# Patient Record
Sex: MALE | Race: WHITE | NOT HISPANIC OR LATINO | Employment: OTHER | URBAN - METROPOLITAN AREA
[De-identification: names, ages, dates, MRNs, and addresses within clinical notes are randomized per-mention and may not be internally consistent; named-entity substitution may affect disease eponyms.]

---

## 2024-02-29 ENCOUNTER — APPOINTMENT (OUTPATIENT)
Dept: RADIOLOGY | Facility: HOSPITAL | Age: 34
End: 2024-02-29
Payer: COMMERCIAL

## 2024-02-29 ENCOUNTER — HOSPITAL ENCOUNTER (EMERGENCY)
Facility: HOSPITAL | Age: 34
Discharge: HOME | End: 2024-02-29
Attending: EMERGENCY MEDICINE
Payer: COMMERCIAL

## 2024-02-29 VITALS
HEART RATE: 70 BPM | BODY MASS INDEX: 30.42 KG/M2 | RESPIRATION RATE: 16 BRPM | OXYGEN SATURATION: 96 % | SYSTOLIC BLOOD PRESSURE: 138 MMHG | TEMPERATURE: 98.2 F | HEIGHT: 73 IN | DIASTOLIC BLOOD PRESSURE: 80 MMHG | WEIGHT: 229.5 LBS

## 2024-02-29 DIAGNOSIS — N20.1 URETERAL CALCULUS, LEFT: Primary | ICD-10-CM

## 2024-02-29 LAB
ALBUMIN SERPL-MCNC: 4.6 G/DL (ref 3.5–5)
ALP BLD-CCNC: 61 U/L (ref 35–125)
ALT SERPL-CCNC: 36 U/L (ref 5–40)
AMORPH CRY #/AREA UR COMP ASSIST: ABNORMAL /HPF
ANION GAP SERPL CALC-SCNC: 8 MMOL/L
APPEARANCE UR: ABNORMAL
AST SERPL-CCNC: 21 U/L (ref 5–40)
BASOPHILS # BLD AUTO: 0.03 X10*3/UL (ref 0–0.1)
BASOPHILS NFR BLD AUTO: 0.4 %
BILIRUB SERPL-MCNC: 0.4 MG/DL (ref 0.1–1.2)
BILIRUB UR STRIP.AUTO-MCNC: NEGATIVE MG/DL
BUN SERPL-MCNC: 14 MG/DL (ref 8–25)
CALCIUM SERPL-MCNC: 9.4 MG/DL (ref 8.5–10.4)
CHLORIDE SERPL-SCNC: 104 MMOL/L (ref 97–107)
CO2 SERPL-SCNC: 25 MMOL/L (ref 24–31)
COLOR UR: YELLOW
CREAT SERPL-MCNC: 0.9 MG/DL (ref 0.4–1.6)
EGFRCR SERPLBLD CKD-EPI 2021: >90 ML/MIN/1.73M*2
EOSINOPHIL # BLD AUTO: 0.12 X10*3/UL (ref 0–0.7)
EOSINOPHIL NFR BLD AUTO: 1.6 %
ERYTHROCYTE [DISTWIDTH] IN BLOOD BY AUTOMATED COUNT: 12.2 % (ref 11.5–14.5)
GLUCOSE SERPL-MCNC: 124 MG/DL (ref 65–99)
GLUCOSE UR STRIP.AUTO-MCNC: NORMAL MG/DL
HCT VFR BLD AUTO: 43.1 % (ref 41–52)
HGB BLD-MCNC: 15 G/DL (ref 13.5–17.5)
IMM GRANULOCYTES # BLD AUTO: 0.02 X10*3/UL (ref 0–0.7)
IMM GRANULOCYTES NFR BLD AUTO: 0.3 % (ref 0–0.9)
KETONES UR STRIP.AUTO-MCNC: NEGATIVE MG/DL
LEUKOCYTE ESTERASE UR QL STRIP.AUTO: NEGATIVE
LIPASE SERPL-CCNC: 22 U/L (ref 16–63)
LYMPHOCYTES # BLD AUTO: 1.47 X10*3/UL (ref 1.2–4.8)
LYMPHOCYTES NFR BLD AUTO: 19.5 %
MCH RBC QN AUTO: 27.7 PG (ref 26–34)
MCHC RBC AUTO-ENTMCNC: 34.8 G/DL (ref 32–36)
MCV RBC AUTO: 80 FL (ref 80–100)
MONOCYTES # BLD AUTO: 0.54 X10*3/UL (ref 0.1–1)
MONOCYTES NFR BLD AUTO: 7.2 %
MUCOUS THREADS #/AREA URNS AUTO: ABNORMAL /LPF
NEUTROPHILS # BLD AUTO: 5.37 X10*3/UL (ref 1.2–7.7)
NEUTROPHILS NFR BLD AUTO: 71 %
NITRITE UR QL STRIP.AUTO: NEGATIVE
NRBC BLD-RTO: 0 /100 WBCS (ref 0–0)
PH UR STRIP.AUTO: 6.5 [PH]
PLATELET # BLD AUTO: 200 X10*3/UL (ref 150–450)
POTASSIUM SERPL-SCNC: 3.5 MMOL/L (ref 3.4–5.1)
PROT SERPL-MCNC: 6.9 G/DL (ref 5.9–7.9)
PROT UR STRIP.AUTO-MCNC: ABNORMAL MG/DL
RBC # BLD AUTO: 5.41 X10*6/UL (ref 4.5–5.9)
RBC # UR STRIP.AUTO: ABNORMAL /UL
RBC #/AREA URNS AUTO: >20 /HPF
SODIUM SERPL-SCNC: 137 MMOL/L (ref 133–145)
SP GR UR STRIP.AUTO: 1.02
UROBILINOGEN UR STRIP.AUTO-MCNC: NORMAL MG/DL
WBC # BLD AUTO: 7.6 X10*3/UL (ref 4.4–11.3)
WBC #/AREA URNS AUTO: ABNORMAL /HPF

## 2024-02-29 PROCEDURE — 2500000002 HC RX 250 W HCPCS SELF ADMINISTERED DRUGS (ALT 637 FOR MEDICARE OP, ALT 636 FOR OP/ED): Performed by: EMERGENCY MEDICINE

## 2024-02-29 PROCEDURE — 74176 CT ABD & PELVIS W/O CONTRAST: CPT

## 2024-02-29 PROCEDURE — 74176 CT ABD & PELVIS W/O CONTRAST: CPT | Performed by: RADIOLOGY

## 2024-02-29 PROCEDURE — 81001 URINALYSIS AUTO W/SCOPE: CPT | Performed by: EMERGENCY MEDICINE

## 2024-02-29 PROCEDURE — 83690 ASSAY OF LIPASE: CPT | Performed by: EMERGENCY MEDICINE

## 2024-02-29 PROCEDURE — 99284 EMERGENCY DEPT VISIT MOD MDM: CPT | Mod: 25

## 2024-02-29 PROCEDURE — 96361 HYDRATE IV INFUSION ADD-ON: CPT

## 2024-02-29 PROCEDURE — 85025 COMPLETE CBC W/AUTO DIFF WBC: CPT | Performed by: EMERGENCY MEDICINE

## 2024-02-29 PROCEDURE — 2500000004 HC RX 250 GENERAL PHARMACY W/ HCPCS (ALT 636 FOR OP/ED): Performed by: EMERGENCY MEDICINE

## 2024-02-29 PROCEDURE — 36415 COLL VENOUS BLD VENIPUNCTURE: CPT | Performed by: EMERGENCY MEDICINE

## 2024-02-29 PROCEDURE — 96374 THER/PROPH/DIAG INJ IV PUSH: CPT

## 2024-02-29 PROCEDURE — 80053 COMPREHEN METABOLIC PANEL: CPT | Performed by: EMERGENCY MEDICINE

## 2024-02-29 RX ORDER — TAMSULOSIN HYDROCHLORIDE 0.4 MG/1
0.4 CAPSULE ORAL DAILY
Qty: 6 CAPSULE | Refills: 0 | Status: SHIPPED | OUTPATIENT
Start: 2024-02-29 | End: 2024-03-06

## 2024-02-29 RX ORDER — IBUPROFEN 600 MG/1
600 TABLET ORAL EVERY 8 HOURS PRN
Qty: 15 TABLET | Refills: 0 | Status: SHIPPED | OUTPATIENT
Start: 2024-02-29 | End: 2024-03-10

## 2024-02-29 RX ORDER — TAMSULOSIN HYDROCHLORIDE 0.4 MG/1
0.4 CAPSULE ORAL ONCE
Status: COMPLETED | OUTPATIENT
Start: 2024-02-29 | End: 2024-02-29

## 2024-02-29 RX ORDER — KETOROLAC TROMETHAMINE 30 MG/ML
15 INJECTION, SOLUTION INTRAMUSCULAR; INTRAVENOUS ONCE
Status: COMPLETED | OUTPATIENT
Start: 2024-02-29 | End: 2024-02-29

## 2024-02-29 RX ADMIN — TAMSULOSIN HYDROCHLORIDE 0.4 MG: 0.4 CAPSULE ORAL at 19:41

## 2024-02-29 RX ADMIN — SODIUM CHLORIDE, SODIUM LACTATE, POTASSIUM CHLORIDE, AND CALCIUM CHLORIDE 1000 ML: 600; 310; 30; 20 INJECTION, SOLUTION INTRAVENOUS at 15:33

## 2024-02-29 RX ADMIN — KETOROLAC TROMETHAMINE 15 MG: 30 INJECTION INTRAMUSCULAR; INTRAVENOUS at 15:33

## 2024-02-29 ASSESSMENT — PAIN SCALES - GENERAL
PAINLEVEL_OUTOF10: 0 - NO PAIN
PAINLEVEL_OUTOF10: 7

## 2024-02-29 ASSESSMENT — COLUMBIA-SUICIDE SEVERITY RATING SCALE - C-SSRS
2. HAVE YOU ACTUALLY HAD ANY THOUGHTS OF KILLING YOURSELF?: NO
1. IN THE PAST MONTH, HAVE YOU WISHED YOU WERE DEAD OR WISHED YOU COULD GO TO SLEEP AND NOT WAKE UP?: NO
6. HAVE YOU EVER DONE ANYTHING, STARTED TO DO ANYTHING, OR PREPARED TO DO ANYTHING TO END YOUR LIFE?: NO

## 2024-02-29 ASSESSMENT — PAIN DESCRIPTION - PAIN TYPE: TYPE: ACUTE PAIN

## 2024-02-29 ASSESSMENT — PAIN DESCRIPTION - PROGRESSION: CLINICAL_PROGRESSION: NOT CHANGED

## 2024-02-29 ASSESSMENT — PAIN DESCRIPTION - ONSET: ONSET: SUDDEN

## 2024-02-29 ASSESSMENT — PAIN - FUNCTIONAL ASSESSMENT: PAIN_FUNCTIONAL_ASSESSMENT: 0-10

## 2024-02-29 ASSESSMENT — PAIN DESCRIPTION - ORIENTATION: ORIENTATION: LEFT

## 2024-02-29 ASSESSMENT — PAIN DESCRIPTION - DESCRIPTORS: DESCRIPTORS: SHARP

## 2024-02-29 ASSESSMENT — PAIN DESCRIPTION - FREQUENCY: FREQUENCY: INTERMITTENT

## 2024-02-29 NOTE — ED PROVIDER NOTES
HPI   Chief Complaint   Patient presents with    Flank Pain     I started ;having a twinge of pain yesterday but it got worse today the pain is in my lt flank it is sharp and I feel nauseated and the pain goes to the front lt abd       The patient is a 33-year-old male who presents for evaluation of left flank pain.  The day for yesterday the patient states that he suddenly developed a pain in his left flank that was wrapping around to the left lower quadrant.  Initially it felt like a mild backache.  The pain was mild and eventually resolved spontaneously.  The patient had no pain yesterday.  Today approximately 90 minutes prior to arrival he was crouched down and bending forward working on a tractor when the pain returned.  This time the pain was much more severe and was accompanied by some nausea.  No vomiting.  No testicular pain.  The patient reports that the pain is 7 out of 10 now and was even more severe prior to arrival.  Denies any dysuria or gross hematuria.  No fevers.  No known history of kidney stones.                          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    Nondisplaced fracture of distal phalanx of right great toe, initial encounter for open fracture 06/11/2019    Open nondisplaced fracture of distal phalanx of right great toe, initial encounter    Pain in right toe(s) 06/11/2019    Pain of toe of right foot    Personal history of other benign neoplasm 07/31/2015    History of other benign neoplasm    Personal history of other diseases of the nervous system and sense organs 07/31/2015    History of exotropia    Streptococcal pharyngitis 09/13/2019    Streptococcus pharyngitis    Unspecified exophthalmos 02/02/2015    Proptosis    Unspecified  exophthalmos 07/31/2015    Exophthalmos, acquired    Unspecified open wound of unspecified toe(s) without damage to nail, initial encounter 06/25/2019    Open wound of toe, initial encounter     No past surgical history on file.  No family history on file.  Social History     Tobacco Use    Smoking status: Not on file    Smokeless tobacco: Not on file   Substance Use Topics    Alcohol use: Not on file    Drug use: Not on file       Physical Exam   ED Triage Vitals [02/29/24 1516]   Temperature Heart Rate Respirations BP   36.8 °C (98.2 °F) 76 18 (!) 150/92      Pulse Ox Temp Source Heart Rate Source Patient Position   98 % Oral Monitor Sitting      BP Location FiO2 (%)     Left arm --       Physical Exam  Vitals and nursing note reviewed.   Constitutional:       General: He is not in acute distress.     Appearance: Normal appearance.   HENT:      Head: Normocephalic and atraumatic.      Mouth/Throat:      Mouth: Mucous membranes are moist.      Pharynx: Oropharynx is clear. No oropharyngeal exudate or posterior oropharyngeal erythema.   Eyes:      Extraocular Movements: Extraocular movements intact.      Conjunctiva/sclera: Conjunctivae normal.      Pupils: Pupils are equal, round, and reactive to light.   Cardiovascular:      Rate and Rhythm: Normal rate and regular rhythm.      Heart sounds: No murmur heard.  Pulmonary:      Effort: Pulmonary effort is normal.      Breath sounds: Normal breath sounds. No wheezing, rhonchi or rales.   Abdominal:      General: Abdomen is flat. There is no distension.      Palpations: Abdomen is soft.      Tenderness: There is no abdominal tenderness. There is left CVA tenderness. There is no right CVA tenderness.      Comments: Although the patient reports that he has a feeling of pain radiating around to the left lower quadrant, there is no tenderness elicited with palpation of the left lower quadrant.   Musculoskeletal:      Cervical back: Normal range of motion and neck supple.  No rigidity or tenderness.   Lymphadenopathy:      Cervical: No cervical adenopathy.   Skin:     General: Skin is warm and dry.      Findings: No rash.   Neurological:      Mental Status: He is alert.   Psychiatric:         Mood and Affect: Mood normal.         Behavior: Behavior normal.         ED Course & MDM   Diagnoses as of 02/29/24 1923   Ureteral calculus, left       Medical Decision Making  The patient was treated with IV fluids and IV Toradol.  His pain was well-controlled.  Labs were reviewed.  CBC and basic metabolic panel were unremarkable.  Urinalysis showed some microscopic hematuria but no signs of infection.  CT abdomen and pelvis read by the radiologist shows a 7 mm proximal left ureteral stone with trace left-sided caliectasis.  Additional tiny nonobstructing stones are seen on the left and there is a questionable tiny stone on the right.    The patient's pain is well-controlled.  Findings are consistent with pain related to a ureteral stone and ureteral colic.  The patient will be sent home with prescriptions for Flomax and ibuprofen.  A dose of Flomax was given here.  He was instructed to drink plenty of fluid and strain all urine.  He was given a urology referral for follow-up in 3 to 4 days.  He is encouraged to return if he has severe uncontrolled pain or uncontrolled vomiting or fever or if he feels worse in any way.        Procedure  Procedures     Manuelito Delgado, DO  02/29/24 1923

## 2024-02-29 NOTE — PROGRESS NOTES
Pharmacy Medication History Review    Shaun Rodriguez is a 33 y.o. male admitted for flank pain. Pharmacy reviewed the patient's [id number] medications and allergies for accuracy.    Pt is not taking any prescription or non-prescription medications at this time. Denies taking any OTCs, vitamins, supplements. No inhalers, infusions, injectables, eye drops, or other miscellaneous agents.     The list below reflectives the updated allergy list. Please review each documented allergy for additional clarification and justification.  Allergies  Reviewed by Fuad Franks, EMT on 2/29/2024   No Known Allergies       Preferred pharmacy: [facility] [address] [place] [address]    Norris Giraldo, PharmD

## 2024-03-01 ENCOUNTER — HOSPITAL ENCOUNTER (EMERGENCY)
Facility: HOSPITAL | Age: 34
Discharge: HOME | End: 2024-03-01
Attending: EMERGENCY MEDICINE
Payer: COMMERCIAL

## 2024-03-01 VITALS
WEIGHT: 229.28 LBS | OXYGEN SATURATION: 97 % | RESPIRATION RATE: 18 BRPM | HEART RATE: 88 BPM | TEMPERATURE: 98.6 F | BODY MASS INDEX: 31.05 KG/M2 | HEIGHT: 72 IN | DIASTOLIC BLOOD PRESSURE: 88 MMHG | SYSTOLIC BLOOD PRESSURE: 150 MMHG

## 2024-03-01 DIAGNOSIS — N20.1 URETERAL CALCULUS, LEFT: Primary | ICD-10-CM

## 2024-03-01 PROCEDURE — 96374 THER/PROPH/DIAG INJ IV PUSH: CPT

## 2024-03-01 PROCEDURE — 96375 TX/PRO/DX INJ NEW DRUG ADDON: CPT

## 2024-03-01 PROCEDURE — 96361 HYDRATE IV INFUSION ADD-ON: CPT

## 2024-03-01 PROCEDURE — 99284 EMERGENCY DEPT VISIT MOD MDM: CPT | Mod: 25

## 2024-03-01 PROCEDURE — 2500000004 HC RX 250 GENERAL PHARMACY W/ HCPCS (ALT 636 FOR OP/ED): Performed by: EMERGENCY MEDICINE

## 2024-03-01 RX ORDER — ONDANSETRON HYDROCHLORIDE 2 MG/ML
4 INJECTION, SOLUTION INTRAVENOUS ONCE
Status: COMPLETED | OUTPATIENT
Start: 2024-03-01 | End: 2024-03-01

## 2024-03-01 RX ORDER — ONDANSETRON 4 MG/1
4 TABLET, FILM COATED ORAL EVERY 6 HOURS
Qty: 12 TABLET | Refills: 0 | Status: SHIPPED | OUTPATIENT
Start: 2024-03-01 | End: 2024-03-04

## 2024-03-01 RX ORDER — OXYCODONE AND ACETAMINOPHEN 5; 325 MG/1; MG/1
1 TABLET ORAL EVERY 6 HOURS PRN
Qty: 5 TABLET | Refills: 0 | Status: SHIPPED | OUTPATIENT
Start: 2024-03-01 | End: 2024-03-02 | Stop reason: HOSPADM

## 2024-03-01 RX ORDER — KETOROLAC TROMETHAMINE 30 MG/ML
30 INJECTION, SOLUTION INTRAMUSCULAR; INTRAVENOUS ONCE
Status: COMPLETED | OUTPATIENT
Start: 2024-03-01 | End: 2024-03-01

## 2024-03-01 RX ORDER — MORPHINE SULFATE 4 MG/ML
4 INJECTION, SOLUTION INTRAMUSCULAR; INTRAVENOUS ONCE
Status: COMPLETED | OUTPATIENT
Start: 2024-03-01 | End: 2024-03-01

## 2024-03-01 RX ADMIN — KETOROLAC TROMETHAMINE 30 MG: 30 INJECTION, SOLUTION INTRAMUSCULAR; INTRAVENOUS at 01:21

## 2024-03-01 RX ADMIN — ONDANSETRON 4 MG: 2 INJECTION INTRAMUSCULAR; INTRAVENOUS at 01:21

## 2024-03-01 RX ADMIN — MORPHINE SULFATE 4 MG: 4 INJECTION, SOLUTION INTRAMUSCULAR; INTRAVENOUS at 01:21

## 2024-03-01 RX ADMIN — SODIUM CHLORIDE 1000 ML: 900 INJECTION, SOLUTION INTRAVENOUS at 01:21

## 2024-03-01 ASSESSMENT — PAIN - FUNCTIONAL ASSESSMENT
PAIN_FUNCTIONAL_ASSESSMENT: 0-10

## 2024-03-01 ASSESSMENT — PAIN DESCRIPTION - DESCRIPTORS
DESCRIPTORS: DISCOMFORT;ACHING
DESCRIPTORS: ACHING

## 2024-03-01 ASSESSMENT — COLUMBIA-SUICIDE SEVERITY RATING SCALE - C-SSRS
1. IN THE PAST MONTH, HAVE YOU WISHED YOU WERE DEAD OR WISHED YOU COULD GO TO SLEEP AND NOT WAKE UP?: NO
2. HAVE YOU ACTUALLY HAD ANY THOUGHTS OF KILLING YOURSELF?: NO
6. HAVE YOU EVER DONE ANYTHING, STARTED TO DO ANYTHING, OR PREPARED TO DO ANYTHING TO END YOUR LIFE?: NO

## 2024-03-01 ASSESSMENT — PAIN DESCRIPTION - LOCATION: LOCATION: BACK

## 2024-03-01 ASSESSMENT — PAIN SCALES - GENERAL
PAINLEVEL_OUTOF10: 0 - NO PAIN
PAINLEVEL_OUTOF10: 6
PAINLEVEL_OUTOF10: 2

## 2024-03-01 ASSESSMENT — PAIN DESCRIPTION - PAIN TYPE: TYPE: ACUTE PAIN

## 2024-03-01 ASSESSMENT — PAIN DESCRIPTION - ORIENTATION: ORIENTATION: LEFT;LOWER

## 2024-03-01 NOTE — ED PROVIDER NOTES
HPI   Chief Complaint   Patient presents with    Flank Pain     Pt seen last night with dx of kidney stones. Pt has left sided flank pain that is not controlled with over the counter tylenol. Pt last took tylenol around 1130pm last night. Pt threw up in lobby bathroom prior to triage tonight       HPI  33-year-old male presents with complaint of left flank pain nausea vomiting.  Patient was seen yesterday for same diagnosis of millimeter short and kidney stone.  Is been taking ibuprofen but he woke up tonight with severe left-sided abdominal pain and vomiting.  No fevers or chills.  No chest pain or shortness of breath.  His pain is better controlled in the emergency department.  He was given referral to urology yesterday.  No other complaints.                  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    Nondisplaced fracture of distal phalanx of right great toe, initial encounter for open fracture 06/11/2019    Open nondisplaced fracture of distal phalanx of right great toe, initial encounter    Pain in right toe(s) 06/11/2019    Pain of toe of right foot    Personal history of other benign neoplasm 07/31/2015    History of other benign neoplasm    Personal history of other diseases of the nervous system and sense organs 07/31/2015    History of exotropia    Streptococcal pharyngitis 09/13/2019    Streptococcus pharyngitis    Unspecified exophthalmos 02/02/2015    Proptosis    Unspecified exophthalmos 07/31/2015    Exophthalmos, acquired    Unspecified open wound of unspecified toe(s) without damage to nail, initial encounter 06/25/2019    Open wound of toe, initial encounter     No past surgical history on file.  No family history on file.  Social History     Tobacco  Use    Smoking status: Not on file    Smokeless tobacco: Not on file   Substance Use Topics    Alcohol use: Not on file    Drug use: Not on file       Physical Exam   ED Triage Vitals [03/01/24 0100]   Temperature Heart Rate Respirations BP   36.1 °C (96.9 °F) 91 18 (!) 159/100      Pulse Ox Temp Source Heart Rate Source Patient Position   97 % Tympanic Monitor Sitting      BP Location FiO2 (%)     Right arm --       Physical Exam  General:  Awake, alert, no acute distress.  Head: Normocephalic, Atraumatic  Neck: Supple, trachea midline, no stridor  Skin: Warm and dry, no rashes   Lungs: Clear to auscultation bilaterally no acute respiratory distress, speaking in full sentences without difficulty  CV: Regular Rate Rhythm with no obvious murmurs gallops rubs noted, no jugular venous distention, no pedal edema   Abdomen: Soft, nontender, nondistended, positive bowel sounds, no peritoneal signs  Neuro:  No gross focal neurologic deficits, NIH is 0  Musculoskeletal:  Full range of motion in all 4 extremities  Psychiatric:  Alert oriented x 3, Good insight into condition.  ED Course & MDM   Diagnoses as of 03/01/24 0124   Ureteral calculus, left       Medical Decision Making  I reviewed the patient's visit from last night.  He has a 7 mm tract and left kidney stone on his CAT scan but this does not need to repeated tonight.  His laboratory studies are unremarkable.  He was treated symptomatically with IV fluids, Toradol, morphine, Zofran.  Prescription for Percocet and Zofran for tomorrow and referred to urology.  He is stable for discharge.    Procedure  Procedures     Brayden Rolon DO  03/01/24 0125

## 2024-03-01 NOTE — DISCHARGE INSTRUCTIONS
Thank you for choosing [facility]. It was my pleasure to be involved in your care today.         As of today's visit, based on reasonable likelihood, that it is safe for you to be discharged back to your residence to follow-up as an outpatient for ongoing management of your medical problem. You should follow-up with any referrals / primary provider as soon as possible. The contacts (number, addresses) are listed below.         *Return to us immediately, if you feel you are getting worse, not getting better, or any new symptoms develop.         Make sure your pharmacy and primary doctor is aware of any new medications prescribed today.          It is your responsibility to contact as soon as possible, and follow through with, any referrals you were given today. We do recommend you inform them you are a [facility] follow-up patient, as often they can better accommodate your need to be seen, provided their schedules allow. We will, and have, made every effort to ensure you have access to adequate follow-up specialists available.          All problems may not be able to be fixed in one ER visit. This is why timely ongoing care is important, and this is a responsibility you share in. Further, you are free to follow up with any provider you choose, and this is not limited to our suggestion.          If cultures were obtained today, you will be contacted should anything result that would require further treatment. Please contact the ED at the number provided with questions.          Having trouble affording medications? Try [email or web address]! (This is not a hospital endorsed website, merely a recommendation based on my own personal experiences with [employer])

## 2024-03-01 NOTE — DISCHARGE INSTRUCTIONS
Drink plenty of fluid.    Strain all urine.    You may take Tylenol over-the-counter as needed.    Follow-up with the urologist below in 3 to 4 days.

## 2024-03-02 ENCOUNTER — ANESTHESIA (OUTPATIENT)
Dept: OPERATING ROOM | Facility: HOSPITAL | Age: 34
End: 2024-03-02
Payer: COMMERCIAL

## 2024-03-02 ENCOUNTER — HOSPITAL ENCOUNTER (OUTPATIENT)
Facility: HOSPITAL | Age: 34
Setting detail: OBSERVATION
Discharge: HOME | End: 2024-03-02
Attending: STUDENT IN AN ORGANIZED HEALTH CARE EDUCATION/TRAINING PROGRAM | Admitting: INTERNAL MEDICINE
Payer: COMMERCIAL

## 2024-03-02 ENCOUNTER — PREP FOR PROCEDURE (OUTPATIENT)
Dept: UROLOGY | Facility: HOSPITAL | Age: 34
End: 2024-03-02

## 2024-03-02 ENCOUNTER — ANESTHESIA EVENT (OUTPATIENT)
Dept: OPERATING ROOM | Facility: HOSPITAL | Age: 34
End: 2024-03-02
Payer: COMMERCIAL

## 2024-03-02 ENCOUNTER — APPOINTMENT (OUTPATIENT)
Dept: RADIOLOGY | Facility: HOSPITAL | Age: 34
End: 2024-03-02
Payer: COMMERCIAL

## 2024-03-02 VITALS
SYSTOLIC BLOOD PRESSURE: 148 MMHG | HEART RATE: 78 BPM | HEIGHT: 72 IN | TEMPERATURE: 97.2 F | DIASTOLIC BLOOD PRESSURE: 79 MMHG | WEIGHT: 214 LBS | BODY MASS INDEX: 28.99 KG/M2 | OXYGEN SATURATION: 99 % | RESPIRATION RATE: 16 BRPM

## 2024-03-02 DIAGNOSIS — N20.0 NEPHROLITHIASIS: ICD-10-CM

## 2024-03-02 DIAGNOSIS — N13.9 OBSTRUCTIVE UROPATHY: Primary | ICD-10-CM

## 2024-03-02 DIAGNOSIS — N20.1 LEFT URETERAL STONE: Primary | ICD-10-CM

## 2024-03-02 DIAGNOSIS — N17.9 AKI (ACUTE KIDNEY INJURY) (CMS-HCC): ICD-10-CM

## 2024-03-02 DIAGNOSIS — N20.0 KIDNEY STONES: ICD-10-CM

## 2024-03-02 DIAGNOSIS — N20.1 LEFT URETERAL STONE: ICD-10-CM

## 2024-03-02 LAB
ALBUMIN SERPL BCP-MCNC: 4.2 G/DL (ref 3.4–5)
ALP SERPL-CCNC: 50 U/L (ref 33–120)
ALT SERPL W P-5'-P-CCNC: 26 U/L (ref 10–52)
ANION GAP SERPL CALC-SCNC: 12 MMOL/L (ref 10–20)
APPEARANCE UR: CLEAR
AST SERPL W P-5'-P-CCNC: 18 U/L (ref 9–39)
BASOPHILS # BLD AUTO: 0.03 X10*3/UL (ref 0–0.1)
BASOPHILS NFR BLD AUTO: 0.3 %
BILIRUB SERPL-MCNC: 1 MG/DL (ref 0–1.2)
BILIRUB UR STRIP.AUTO-MCNC: NEGATIVE MG/DL
BUN SERPL-MCNC: 16 MG/DL (ref 6–23)
CALCIUM SERPL-MCNC: 8.7 MG/DL (ref 8.6–10.3)
CHLORIDE SERPL-SCNC: 103 MMOL/L (ref 98–107)
CO2 SERPL-SCNC: 26 MMOL/L (ref 21–32)
COLOR UR: ABNORMAL
CREAT SERPL-MCNC: 1.31 MG/DL (ref 0.5–1.3)
EGFRCR SERPLBLD CKD-EPI 2021: 74 ML/MIN/1.73M*2
EOSINOPHIL # BLD AUTO: 0.07 X10*3/UL (ref 0–0.7)
EOSINOPHIL NFR BLD AUTO: 0.6 %
ERYTHROCYTE [DISTWIDTH] IN BLOOD BY AUTOMATED COUNT: 12.4 % (ref 11.5–14.5)
GLUCOSE SERPL-MCNC: 117 MG/DL (ref 74–99)
GLUCOSE UR STRIP.AUTO-MCNC: NEGATIVE MG/DL
HCT VFR BLD AUTO: 42.3 % (ref 41–52)
HGB BLD-MCNC: 14.3 G/DL (ref 13.5–17.5)
IMM GRANULOCYTES # BLD AUTO: 0.04 X10*3/UL (ref 0–0.7)
IMM GRANULOCYTES NFR BLD AUTO: 0.3 % (ref 0–0.9)
KETONES UR STRIP.AUTO-MCNC: ABNORMAL MG/DL
LACTATE SERPL-SCNC: 0.7 MMOL/L (ref 0.4–2)
LEUKOCYTE ESTERASE UR QL STRIP.AUTO: NEGATIVE
LIPASE SERPL-CCNC: 20 U/L (ref 9–82)
LYMPHOCYTES # BLD AUTO: 0.91 X10*3/UL (ref 1.2–4.8)
LYMPHOCYTES NFR BLD AUTO: 7.8 %
MCH RBC QN AUTO: 28.3 PG (ref 26–34)
MCHC RBC AUTO-ENTMCNC: 33.8 G/DL (ref 32–36)
MCV RBC AUTO: 84 FL (ref 80–100)
MONOCYTES # BLD AUTO: 0.95 X10*3/UL (ref 0.1–1)
MONOCYTES NFR BLD AUTO: 8.1 %
MUCOUS THREADS #/AREA URNS AUTO: ABNORMAL /LPF
NEUTROPHILS # BLD AUTO: 9.74 X10*3/UL (ref 1.2–7.7)
NEUTROPHILS NFR BLD AUTO: 82.9 %
NITRITE UR QL STRIP.AUTO: NEGATIVE
NRBC BLD-RTO: 0 /100 WBCS (ref 0–0)
PH UR STRIP.AUTO: 7 [PH]
PLATELET # BLD AUTO: 179 X10*3/UL (ref 150–450)
POTASSIUM SERPL-SCNC: 3.6 MMOL/L (ref 3.5–5.3)
PROT SERPL-MCNC: 6.6 G/DL (ref 6.4–8.2)
PROT UR STRIP.AUTO-MCNC: ABNORMAL MG/DL
RBC # BLD AUTO: 5.06 X10*6/UL (ref 4.5–5.9)
RBC # UR STRIP.AUTO: ABNORMAL /UL
RBC #/AREA URNS AUTO: >20 /HPF
SODIUM SERPL-SCNC: 137 MMOL/L (ref 136–145)
SP GR UR STRIP.AUTO: 1.01
UROBILINOGEN UR STRIP.AUTO-MCNC: <2 MG/DL
WBC # BLD AUTO: 11.7 X10*3/UL (ref 4.4–11.3)
WBC #/AREA URNS AUTO: ABNORMAL /HPF

## 2024-03-02 PROCEDURE — 3600000009 HC OR TIME - EACH INCREMENTAL 1 MINUTE - PROCEDURE LEVEL FOUR: Performed by: STUDENT IN AN ORGANIZED HEALTH CARE EDUCATION/TRAINING PROGRAM

## 2024-03-02 PROCEDURE — 2500000004 HC RX 250 GENERAL PHARMACY W/ HCPCS (ALT 636 FOR OP/ED): Performed by: STUDENT IN AN ORGANIZED HEALTH CARE EDUCATION/TRAINING PROGRAM

## 2024-03-02 PROCEDURE — G0378 HOSPITAL OBSERVATION PER HR: HCPCS

## 2024-03-02 PROCEDURE — 36415 COLL VENOUS BLD VENIPUNCTURE: CPT | Performed by: STUDENT IN AN ORGANIZED HEALTH CARE EDUCATION/TRAINING PROGRAM

## 2024-03-02 PROCEDURE — 2500000002 HC RX 250 W HCPCS SELF ADMINISTERED DRUGS (ALT 637 FOR MEDICARE OP, ALT 636 FOR OP/ED): Performed by: INTERNAL MEDICINE

## 2024-03-02 PROCEDURE — C1769 GUIDE WIRE: HCPCS | Performed by: STUDENT IN AN ORGANIZED HEALTH CARE EDUCATION/TRAINING PROGRAM

## 2024-03-02 PROCEDURE — 2500000004 HC RX 250 GENERAL PHARMACY W/ HCPCS (ALT 636 FOR OP/ED): Performed by: INTERNAL MEDICINE

## 2024-03-02 PROCEDURE — 74420 UROGRAPHY RTRGR +-KUB: CPT

## 2024-03-02 PROCEDURE — 96365 THER/PROPH/DIAG IV INF INIT: CPT | Mod: 59 | Performed by: STUDENT IN AN ORGANIZED HEALTH CARE EDUCATION/TRAINING PROGRAM

## 2024-03-02 PROCEDURE — 2500000004 HC RX 250 GENERAL PHARMACY W/ HCPCS (ALT 636 FOR OP/ED)

## 2024-03-02 PROCEDURE — 2500000005 HC RX 250 GENERAL PHARMACY W/O HCPCS: Performed by: NURSE ANESTHETIST, CERTIFIED REGISTERED

## 2024-03-02 PROCEDURE — 7100000001 HC RECOVERY ROOM TIME - INITIAL BASE CHARGE: Performed by: STUDENT IN AN ORGANIZED HEALTH CARE EDUCATION/TRAINING PROGRAM

## 2024-03-02 PROCEDURE — 3700000002 HC GENERAL ANESTHESIA TIME - EACH INCREMENTAL 1 MINUTE: Performed by: STUDENT IN AN ORGANIZED HEALTH CARE EDUCATION/TRAINING PROGRAM

## 2024-03-02 PROCEDURE — 2780000003 HC OR 278 NO HCPCS: Performed by: STUDENT IN AN ORGANIZED HEALTH CARE EDUCATION/TRAINING PROGRAM

## 2024-03-02 PROCEDURE — 3700000001 HC GENERAL ANESTHESIA TIME - INITIAL BASE CHARGE: Performed by: STUDENT IN AN ORGANIZED HEALTH CARE EDUCATION/TRAINING PROGRAM

## 2024-03-02 PROCEDURE — 52356 CYSTO/URETERO W/LITHOTRIPSY: CPT | Performed by: STUDENT IN AN ORGANIZED HEALTH CARE EDUCATION/TRAINING PROGRAM

## 2024-03-02 PROCEDURE — 2500000004 HC RX 250 GENERAL PHARMACY W/ HCPCS (ALT 636 FOR OP/ED): Performed by: NURSE ANESTHETIST, CERTIFIED REGISTERED

## 2024-03-02 PROCEDURE — 84075 ASSAY ALKALINE PHOSPHATASE: CPT | Performed by: STUDENT IN AN ORGANIZED HEALTH CARE EDUCATION/TRAINING PROGRAM

## 2024-03-02 PROCEDURE — 96375 TX/PRO/DX INJ NEW DRUG ADDON: CPT | Mod: 59 | Performed by: STUDENT IN AN ORGANIZED HEALTH CARE EDUCATION/TRAINING PROGRAM

## 2024-03-02 PROCEDURE — 7100000002 HC RECOVERY ROOM TIME - EACH INCREMENTAL 1 MINUTE: Performed by: STUDENT IN AN ORGANIZED HEALTH CARE EDUCATION/TRAINING PROGRAM

## 2024-03-02 PROCEDURE — 83605 ASSAY OF LACTIC ACID: CPT | Performed by: STUDENT IN AN ORGANIZED HEALTH CARE EDUCATION/TRAINING PROGRAM

## 2024-03-02 PROCEDURE — 74420 UROGRAPHY RTRGR +-KUB: CPT | Performed by: RADIOLOGY

## 2024-03-02 PROCEDURE — 99285 EMERGENCY DEPT VISIT HI MDM: CPT | Mod: 25

## 2024-03-02 PROCEDURE — 85025 COMPLETE CBC W/AUTO DIFF WBC: CPT | Performed by: STUDENT IN AN ORGANIZED HEALTH CARE EDUCATION/TRAINING PROGRAM

## 2024-03-02 PROCEDURE — 81001 URINALYSIS AUTO W/SCOPE: CPT | Performed by: STUDENT IN AN ORGANIZED HEALTH CARE EDUCATION/TRAINING PROGRAM

## 2024-03-02 PROCEDURE — 99236 HOSP IP/OBS SAME DATE HI 85: CPT

## 2024-03-02 PROCEDURE — 2720000007 HC OR 272 NO HCPCS: Performed by: STUDENT IN AN ORGANIZED HEALTH CARE EDUCATION/TRAINING PROGRAM

## 2024-03-02 PROCEDURE — 74420 UROGRAPHY RTRGR +-KUB: CPT | Performed by: STUDENT IN AN ORGANIZED HEALTH CARE EDUCATION/TRAINING PROGRAM

## 2024-03-02 PROCEDURE — 3600000004 HC OR TIME - INITIAL BASE CHARGE - PROCEDURE LEVEL FOUR: Performed by: STUDENT IN AN ORGANIZED HEALTH CARE EDUCATION/TRAINING PROGRAM

## 2024-03-02 PROCEDURE — 2500000005 HC RX 250 GENERAL PHARMACY W/O HCPCS: Performed by: STUDENT IN AN ORGANIZED HEALTH CARE EDUCATION/TRAINING PROGRAM

## 2024-03-02 PROCEDURE — 99222 1ST HOSP IP/OBS MODERATE 55: CPT | Performed by: STUDENT IN AN ORGANIZED HEALTH CARE EDUCATION/TRAINING PROGRAM

## 2024-03-02 PROCEDURE — 83690 ASSAY OF LIPASE: CPT | Performed by: STUDENT IN AN ORGANIZED HEALTH CARE EDUCATION/TRAINING PROGRAM

## 2024-03-02 DEVICE — IMPLANTABLE DEVICE: Type: IMPLANTABLE DEVICE | Site: URETER | Status: FUNCTIONAL

## 2024-03-02 RX ORDER — KETOROLAC TROMETHAMINE 30 MG/ML
INJECTION, SOLUTION INTRAMUSCULAR; INTRAVENOUS AS NEEDED
Status: DISCONTINUED | OUTPATIENT
Start: 2024-03-02 | End: 2024-03-02

## 2024-03-02 RX ORDER — OXYCODONE HYDROCHLORIDE 5 MG/1
10 TABLET ORAL EVERY 6 HOURS PRN
Status: DISCONTINUED | OUTPATIENT
Start: 2024-03-02 | End: 2024-03-02

## 2024-03-02 RX ORDER — MORPHINE SULFATE 4 MG/ML
4 INJECTION INTRAVENOUS ONCE
Status: COMPLETED | OUTPATIENT
Start: 2024-03-02 | End: 2024-03-02

## 2024-03-02 RX ORDER — MORPHINE SULFATE 2 MG/ML
2 INJECTION, SOLUTION INTRAMUSCULAR; INTRAVENOUS EVERY 4 HOURS PRN
Status: CANCELLED | OUTPATIENT
Start: 2024-03-02

## 2024-03-02 RX ORDER — DEXAMETHASONE SODIUM PHOSPHATE 4 MG/ML
INJECTION, SOLUTION INTRA-ARTICULAR; INTRALESIONAL; INTRAMUSCULAR; INTRAVENOUS; SOFT TISSUE AS NEEDED
Status: DISCONTINUED | OUTPATIENT
Start: 2024-03-02 | End: 2024-03-02

## 2024-03-02 RX ORDER — ACETAMINOPHEN 325 MG/1
975 TABLET ORAL 4 TIMES DAILY
Qty: 56 TABLET | Refills: 0 | Status: SHIPPED | OUTPATIENT
Start: 2024-03-02

## 2024-03-02 RX ORDER — MORPHINE SULFATE 4 MG/ML
4 INJECTION INTRAVENOUS EVERY 4 HOURS PRN
Status: CANCELLED | OUTPATIENT
Start: 2024-03-02

## 2024-03-02 RX ORDER — PHENAZOPYRIDINE HYDROCHLORIDE 100 MG/1
100 TABLET, FILM COATED ORAL 3 TIMES DAILY PRN
Qty: 15 TABLET | Refills: 0 | Status: SHIPPED | OUTPATIENT
Start: 2024-03-02 | End: 2024-03-07

## 2024-03-02 RX ORDER — ONDANSETRON HYDROCHLORIDE 2 MG/ML
4 INJECTION, SOLUTION INTRAVENOUS EVERY 4 HOURS PRN
Status: DISCONTINUED | OUTPATIENT
Start: 2024-03-02 | End: 2024-03-02 | Stop reason: HOSPADM

## 2024-03-02 RX ORDER — LIDOCAINE HYDROCHLORIDE 10 MG/ML
0.1 INJECTION, SOLUTION EPIDURAL; INFILTRATION; INTRACAUDAL; PERINEURAL ONCE
Status: DISCONTINUED | OUTPATIENT
Start: 2024-03-02 | End: 2024-03-02 | Stop reason: HOSPADM

## 2024-03-02 RX ORDER — ONDANSETRON HYDROCHLORIDE 2 MG/ML
4 INJECTION, SOLUTION INTRAVENOUS ONCE AS NEEDED
Status: DISCONTINUED | OUTPATIENT
Start: 2024-03-02 | End: 2024-03-02 | Stop reason: HOSPADM

## 2024-03-02 RX ORDER — SODIUM CHLORIDE, SODIUM LACTATE, POTASSIUM CHLORIDE, CALCIUM CHLORIDE 600; 310; 30; 20 MG/100ML; MG/100ML; MG/100ML; MG/100ML
100 INJECTION, SOLUTION INTRAVENOUS CONTINUOUS
Status: DISCONTINUED | OUTPATIENT
Start: 2024-03-02 | End: 2024-03-02 | Stop reason: HOSPADM

## 2024-03-02 RX ORDER — MIDAZOLAM HYDROCHLORIDE 1 MG/ML
INJECTION INTRAMUSCULAR; INTRAVENOUS AS NEEDED
Status: DISCONTINUED | OUTPATIENT
Start: 2024-03-02 | End: 2024-03-02

## 2024-03-02 RX ORDER — OXYCODONE HYDROCHLORIDE 5 MG/1
5 TABLET ORAL EVERY 4 HOURS PRN
Status: DISCONTINUED | OUTPATIENT
Start: 2024-03-02 | End: 2024-03-02 | Stop reason: HOSPADM

## 2024-03-02 RX ORDER — ONDANSETRON HYDROCHLORIDE 2 MG/ML
INJECTION, SOLUTION INTRAVENOUS AS NEEDED
Status: DISCONTINUED | OUTPATIENT
Start: 2024-03-02 | End: 2024-03-02

## 2024-03-02 RX ORDER — KETOROLAC TROMETHAMINE 15 MG/ML
10 INJECTION, SOLUTION INTRAMUSCULAR; INTRAVENOUS 2 TIMES DAILY
Status: DISCONTINUED | OUTPATIENT
Start: 2024-03-02 | End: 2024-03-02 | Stop reason: HOSPADM

## 2024-03-02 RX ORDER — ACETAMINOPHEN 325 MG/1
975 TABLET ORAL 4 TIMES DAILY
Status: DISCONTINUED | OUTPATIENT
Start: 2024-03-02 | End: 2024-03-02 | Stop reason: HOSPADM

## 2024-03-02 RX ORDER — DIPHENHYDRAMINE HYDROCHLORIDE 50 MG/ML
25 INJECTION INTRAMUSCULAR; INTRAVENOUS ONCE
Status: DISCONTINUED | OUTPATIENT
Start: 2024-03-02 | End: 2024-03-02

## 2024-03-02 RX ORDER — OXYCODONE HYDROCHLORIDE 5 MG/1
5 TABLET ORAL EVERY 6 HOURS PRN
Status: DISCONTINUED | OUTPATIENT
Start: 2024-03-02 | End: 2024-03-02

## 2024-03-02 RX ORDER — CEFTRIAXONE 1 G/50ML
1 INJECTION, SOLUTION INTRAVENOUS EVERY 24 HOURS
Status: DISCONTINUED | OUTPATIENT
Start: 2024-03-02 | End: 2024-03-02

## 2024-03-02 RX ORDER — HYDRALAZINE HYDROCHLORIDE 20 MG/ML
5 INJECTION INTRAMUSCULAR; INTRAVENOUS EVERY 30 MIN PRN
Status: DISCONTINUED | OUTPATIENT
Start: 2024-03-02 | End: 2024-03-02 | Stop reason: HOSPADM

## 2024-03-02 RX ORDER — MORPHINE SULFATE 2 MG/ML
1 INJECTION, SOLUTION INTRAMUSCULAR; INTRAVENOUS EVERY 4 HOURS PRN
Status: CANCELLED | OUTPATIENT
Start: 2024-03-02

## 2024-03-02 RX ORDER — HEPARIN SODIUM 5000 [USP'U]/ML
5000 INJECTION, SOLUTION INTRAVENOUS; SUBCUTANEOUS EVERY 8 HOURS SCHEDULED
Status: DISCONTINUED | OUTPATIENT
Start: 2024-03-02 | End: 2024-03-02

## 2024-03-02 RX ORDER — TAMSULOSIN HYDROCHLORIDE 0.4 MG/1
0.4 CAPSULE ORAL DAILY
Status: DISCONTINUED | OUTPATIENT
Start: 2024-03-02 | End: 2024-03-02 | Stop reason: HOSPADM

## 2024-03-02 RX ORDER — FENTANYL CITRATE 50 UG/ML
INJECTION, SOLUTION INTRAMUSCULAR; INTRAVENOUS AS NEEDED
Status: DISCONTINUED | OUTPATIENT
Start: 2024-03-02 | End: 2024-03-02

## 2024-03-02 RX ORDER — CEFAZOLIN 1 G/1
INJECTION, POWDER, FOR SOLUTION INTRAVENOUS AS NEEDED
Status: DISCONTINUED | OUTPATIENT
Start: 2024-03-02 | End: 2024-03-02

## 2024-03-02 RX ORDER — HYDROMORPHONE HYDROCHLORIDE 1 MG/ML
0.6 INJECTION, SOLUTION INTRAMUSCULAR; INTRAVENOUS; SUBCUTANEOUS
Status: DISCONTINUED | OUTPATIENT
Start: 2024-03-02 | End: 2024-03-02

## 2024-03-02 RX ORDER — OXYCODONE HYDROCHLORIDE 5 MG/1
5 TABLET ORAL EVERY 6 HOURS PRN
Status: DISCONTINUED | OUTPATIENT
Start: 2024-03-02 | End: 2024-03-02 | Stop reason: HOSPADM

## 2024-03-02 RX ORDER — LIDOCAINE HCL/PF 100 MG/5ML
SYRINGE (ML) INTRAVENOUS AS NEEDED
Status: DISCONTINUED | OUTPATIENT
Start: 2024-03-02 | End: 2024-03-02

## 2024-03-02 RX ORDER — LABETALOL HYDROCHLORIDE 5 MG/ML
5 INJECTION, SOLUTION INTRAVENOUS ONCE AS NEEDED
Status: DISCONTINUED | OUTPATIENT
Start: 2024-03-02 | End: 2024-03-02 | Stop reason: HOSPADM

## 2024-03-02 RX ORDER — ONDANSETRON HYDROCHLORIDE 2 MG/ML
4 INJECTION, SOLUTION INTRAVENOUS ONCE
Status: DISCONTINUED | OUTPATIENT
Start: 2024-03-02 | End: 2024-03-02 | Stop reason: HOSPADM

## 2024-03-02 RX ORDER — KETOROLAC TROMETHAMINE 15 MG/ML
10 INJECTION, SOLUTION INTRAMUSCULAR; INTRAVENOUS 2 TIMES DAILY
Status: DISCONTINUED | OUTPATIENT
Start: 2024-03-02 | End: 2024-03-02

## 2024-03-02 RX ORDER — ALBUTEROL SULFATE 0.83 MG/ML
2.5 SOLUTION RESPIRATORY (INHALATION) ONCE AS NEEDED
Status: DISCONTINUED | OUTPATIENT
Start: 2024-03-02 | End: 2024-03-02 | Stop reason: HOSPADM

## 2024-03-02 RX ORDER — ACETAMINOPHEN 325 MG/1
650 TABLET ORAL EVERY 4 HOURS PRN
Status: DISCONTINUED | OUTPATIENT
Start: 2024-03-02 | End: 2024-03-02 | Stop reason: HOSPADM

## 2024-03-02 RX ORDER — DROPERIDOL 2.5 MG/ML
0.62 INJECTION, SOLUTION INTRAMUSCULAR; INTRAVENOUS ONCE AS NEEDED
Status: DISCONTINUED | OUTPATIENT
Start: 2024-03-02 | End: 2024-03-02 | Stop reason: HOSPADM

## 2024-03-02 RX ORDER — PROPOFOL 10 MG/ML
INJECTION, EMULSION INTRAVENOUS AS NEEDED
Status: DISCONTINUED | OUTPATIENT
Start: 2024-03-02 | End: 2024-03-02

## 2024-03-02 RX ORDER — OXYCODONE HYDROCHLORIDE 5 MG/1
10 TABLET ORAL EVERY 4 HOURS PRN
Status: DISCONTINUED | OUTPATIENT
Start: 2024-03-02 | End: 2024-03-02 | Stop reason: HOSPADM

## 2024-03-02 RX ADMIN — TAMSULOSIN HYDROCHLORIDE 0.4 MG: 0.4 CAPSULE ORAL at 08:38

## 2024-03-02 RX ADMIN — LIDOCAINE HYDROCHLORIDE 100 MG: 20 INJECTION, SOLUTION INTRAVENOUS at 11:25

## 2024-03-02 RX ADMIN — ACETAMINOPHEN 975 MG: 325 TABLET ORAL at 06:01

## 2024-03-02 RX ADMIN — SODIUM CHLORIDE 1000 ML: 9 INJECTION, SOLUTION INTRAVENOUS at 03:15

## 2024-03-02 RX ADMIN — FENTANYL CITRATE 50 MCG: 50 INJECTION, SOLUTION INTRAMUSCULAR; INTRAVENOUS at 12:42

## 2024-03-02 RX ADMIN — FENTANYL CITRATE 25 MCG: 50 INJECTION, SOLUTION INTRAMUSCULAR; INTRAVENOUS at 11:25

## 2024-03-02 RX ADMIN — PROPOFOL 30 MG: 10 INJECTION, EMULSION INTRAVENOUS at 12:41

## 2024-03-02 RX ADMIN — SODIUM CHLORIDE 1000 ML: 9 INJECTION, SOLUTION INTRAVENOUS at 05:12

## 2024-03-02 RX ADMIN — PROPOFOL 50 MG: 10 INJECTION, EMULSION INTRAVENOUS at 12:24

## 2024-03-02 RX ADMIN — PROPOFOL 200 MG: 10 INJECTION, EMULSION INTRAVENOUS at 11:25

## 2024-03-02 RX ADMIN — ONDANSETRON 4 MG: 2 INJECTION INTRAMUSCULAR; INTRAVENOUS at 05:54

## 2024-03-02 RX ADMIN — DEXAMETHASONE SODIUM PHOSPHATE 8 MG: 4 INJECTION INTRA-ARTICULAR; INTRALESIONAL; INTRAMUSCULAR; INTRAVENOUS; SOFT TISSUE at 12:18

## 2024-03-02 RX ADMIN — PROPOFOL 50 MG: 10 INJECTION, EMULSION INTRAVENOUS at 12:23

## 2024-03-02 RX ADMIN — CEFAZOLIN 2 G: 1 INJECTION, POWDER, FOR SOLUTION INTRAMUSCULAR; INTRAVENOUS at 11:33

## 2024-03-02 RX ADMIN — MORPHINE SULFATE 4 MG: 4 INJECTION INTRAVENOUS at 03:43

## 2024-03-02 RX ADMIN — CEFTRIAXONE SODIUM 1 G: 1 INJECTION, SOLUTION INTRAVENOUS at 05:54

## 2024-03-02 RX ADMIN — Medication 3 L/MIN: at 13:08

## 2024-03-02 RX ADMIN — SODIUM CHLORIDE, POTASSIUM CHLORIDE, SODIUM LACTATE AND CALCIUM CHLORIDE 100 ML/HR: 600; 310; 30; 20 INJECTION, SOLUTION INTRAVENOUS at 13:05

## 2024-03-02 RX ADMIN — ONDANSETRON 4 MG: 2 INJECTION, SOLUTION INTRAMUSCULAR; INTRAVENOUS at 12:20

## 2024-03-02 RX ADMIN — SODIUM CHLORIDE, SODIUM LACTATE, POTASSIUM CHLORIDE, AND CALCIUM CHLORIDE 1000 ML: 600; 310; 30; 20 INJECTION, SOLUTION INTRAVENOUS at 06:03

## 2024-03-02 RX ADMIN — FENTANYL CITRATE 75 MCG: 50 INJECTION, SOLUTION INTRAMUSCULAR; INTRAVENOUS at 11:21

## 2024-03-02 RX ADMIN — MIDAZOLAM HYDROCHLORIDE 2 MG: 1 INJECTION, SOLUTION INTRAMUSCULAR; INTRAVENOUS at 11:25

## 2024-03-02 RX ADMIN — KETOROLAC TROMETHAMINE 10 MG: 15 INJECTION, SOLUTION INTRAMUSCULAR; INTRAVENOUS at 15:17

## 2024-03-02 RX ADMIN — HYDROMORPHONE HYDROCHLORIDE 0.25 MG: 1 INJECTION, SOLUTION INTRAMUSCULAR; INTRAVENOUS; SUBCUTANEOUS at 13:31

## 2024-03-02 RX ADMIN — KETOROLAC TROMETHAMINE 30 MG: 30 INJECTION, SOLUTION INTRAMUSCULAR; INTRAVENOUS at 12:40

## 2024-03-02 SDOH — SOCIAL STABILITY: SOCIAL INSECURITY: DO YOU FEEL UNSAFE GOING BACK TO THE PLACE WHERE YOU ARE LIVING?: NO

## 2024-03-02 SDOH — SOCIAL STABILITY: SOCIAL INSECURITY: ABUSE: ADULT

## 2024-03-02 SDOH — SOCIAL STABILITY: SOCIAL INSECURITY: WERE YOU ABLE TO COMPLETE ALL THE BEHAVIORAL HEALTH SCREENINGS?: YES

## 2024-03-02 SDOH — SOCIAL STABILITY: SOCIAL INSECURITY: ARE THERE ANY APPARENT SIGNS OF INJURIES/BEHAVIORS THAT COULD BE RELATED TO ABUSE/NEGLECT?: NO

## 2024-03-02 SDOH — SOCIAL STABILITY: SOCIAL INSECURITY: HAVE YOU HAD THOUGHTS OF HARMING ANYONE ELSE?: NO

## 2024-03-02 SDOH — SOCIAL STABILITY: SOCIAL INSECURITY: HAS ANYONE EVER THREATENED TO HURT YOUR FAMILY OR YOUR PETS?: NO

## 2024-03-02 SDOH — SOCIAL STABILITY: SOCIAL INSECURITY: DO YOU FEEL ANYONE HAS EXPLOITED OR TAKEN ADVANTAGE OF YOU FINANCIALLY OR OF YOUR PERSONAL PROPERTY?: NO

## 2024-03-02 SDOH — SOCIAL STABILITY: SOCIAL INSECURITY: DOES ANYONE TRY TO KEEP YOU FROM HAVING/CONTACTING OTHER FRIENDS OR DOING THINGS OUTSIDE YOUR HOME?: NO

## 2024-03-02 SDOH — SOCIAL STABILITY: SOCIAL INSECURITY: ARE YOU OR HAVE YOU BEEN THREATENED OR ABUSED PHYSICALLY, EMOTIONALLY, OR SEXUALLY BY ANYONE?: NO

## 2024-03-02 ASSESSMENT — COGNITIVE AND FUNCTIONAL STATUS - GENERAL
DAILY ACTIVITIY SCORE: 24
DAILY ACTIVITIY SCORE: 24
MOBILITY SCORE: 24
PATIENT BASELINE BEDBOUND: NO
MOBILITY SCORE: 24

## 2024-03-02 ASSESSMENT — ACTIVITIES OF DAILY LIVING (ADL)
BATHING: INDEPENDENT
DRESSING YOURSELF: INDEPENDENT
ADEQUATE_TO_COMPLETE_ADL: YES
WALKS IN HOME: INDEPENDENT
HEARING - RIGHT EAR: FUNCTIONAL
GROOMING: INDEPENDENT
PATIENT'S MEMORY ADEQUATE TO SAFELY COMPLETE DAILY ACTIVITIES?: YES
TOILETING: INDEPENDENT
LACK_OF_TRANSPORTATION: NO
LACK_OF_TRANSPORTATION: NO
FEEDING YOURSELF: INDEPENDENT
HEARING - LEFT EAR: FUNCTIONAL
JUDGMENT_ADEQUATE_SAFELY_COMPLETE_DAILY_ACTIVITIES: YES

## 2024-03-02 ASSESSMENT — PAIN SCALES - GENERAL
PAINLEVEL_OUTOF10: 4
PAINLEVEL_OUTOF10: 3
PAINLEVEL_OUTOF10: 1
PAINLEVEL_OUTOF10: 5 - MODERATE PAIN
PAINLEVEL_OUTOF10: 0 - NO PAIN
PAINLEVEL_OUTOF10: 2
PAINLEVEL_OUTOF10: 2
PAINLEVEL_OUTOF10: 5 - MODERATE PAIN
PAINLEVEL_OUTOF10: 0 - NO PAIN

## 2024-03-02 ASSESSMENT — LIFESTYLE VARIABLES
EVER HAD A DRINK FIRST THING IN THE MORNING TO STEADY YOUR NERVES TO GET RID OF A HANGOVER: NO
SKIP TO QUESTIONS 9-10: 1
HOW OFTEN DO YOU HAVE A DRINK CONTAINING ALCOHOL: NEVER
EVER FELT BAD OR GUILTY ABOUT YOUR DRINKING: NO
HOW OFTEN DO YOU HAVE 6 OR MORE DRINKS ON ONE OCCASION: NEVER
AUDIT-C TOTAL SCORE: 0
AUDIT-C TOTAL SCORE: 0
HAVE YOU EVER FELT YOU SHOULD CUT DOWN ON YOUR DRINKING: NO
HOW MANY STANDARD DRINKS CONTAINING ALCOHOL DO YOU HAVE ON A TYPICAL DAY: PATIENT DOES NOT DRINK
HAVE PEOPLE ANNOYED YOU BY CRITICIZING YOUR DRINKING: NO

## 2024-03-02 ASSESSMENT — PAIN - FUNCTIONAL ASSESSMENT
PAIN_FUNCTIONAL_ASSESSMENT: 0-10

## 2024-03-02 ASSESSMENT — PATIENT HEALTH QUESTIONNAIRE - PHQ9
2. FEELING DOWN, DEPRESSED OR HOPELESS: NOT AT ALL
1. LITTLE INTEREST OR PLEASURE IN DOING THINGS: NOT AT ALL
SUM OF ALL RESPONSES TO PHQ9 QUESTIONS 1 & 2: 0

## 2024-03-02 ASSESSMENT — PAIN DESCRIPTION - ORIENTATION
ORIENTATION: LEFT
ORIENTATION: LEFT

## 2024-03-02 ASSESSMENT — PAIN DESCRIPTION - LOCATION: LOCATION: ABDOMEN

## 2024-03-02 NOTE — ED TRIAGE NOTES
Pt was seen at [facility] on Thursday and diagnosed with a kidney stone. Pt states he had a 6-7mm stone. Pt has urology appointment scheduled for Monday. Pt states he no ;longer can tolerate the pain. Pt took an oxycodone around 0030 and ibuprofen around 0230am.

## 2024-03-02 NOTE — DISCHARGE SUMMARY
Discharge Diagnosis  Nephrolithiasis        Discharge Meds     Your medication list        START taking these medications        Instructions Last Dose Given Next Dose Due   acetaminophen 325 mg tablet  Commonly known as: Tylenol      Take 3 tablets (975 mg) by mouth 4 times a day.       phenazopyridine 100 mg tablet  Commonly known as: Pyridium      Take 1 tablet (100 mg) by mouth 3 times a day as needed for bladder spasms for up to 5 days.              CONTINUE taking these medications        Instructions Last Dose Given Next Dose Due   ibuprofen 600 mg tablet      Take 1 tablet (600 mg) by mouth every 8 hours if needed for mild pain (1 - 3), fever (temp greater than 38.0 C) or moderate pain (4 - 6) for up to 10 days.       ondansetron 4 mg tablet  Commonly known as: Zofran      Take 1 tablet (4 mg) by mouth every 6 hours for 3 days.       tamsulosin 0.4 mg 24 hr capsule  Commonly known as: Flomax      Take 1 capsule (0.4 mg) by mouth once daily for 6 days.              STOP taking these medications      oxyCODONE-acetaminophen 5-325 mg tablet  Commonly known as: Percocet                  Where to Get Your Medications        These medications were sent to [facility] [address] [place] [id number] [facility]  [address] [facility] [address]      Phone: [phone]   acetaminophen 325 mg tablet  phenazopyridine 100 mg tablet         Test Results Pending At Discharge  Pending Labs       No current pending labs.            Hospital Course  HPI: Patient is a 33 y.o male with no significant pmhx presenting with left sided flank pain that radiates to his abdomen and groin. Patient first noticed this pain on Tuesday and it was initially a 3/10. He visited [facility] ED on Thursday where they diagnosed him with left ureteral stone seen on CT as a 7 mm proximal left ureteral stone with trace left-sided caliectasis and additional tiny non-obstructing stones seen on left. He was discharged with  Flomax and pain medication. He returned after his pain increased to 10/10 with associated nausea and vomiting. Denies blood in the urine or pain with urination. States that urine is dark and with decreased output. No history of kidney stones.   ED course: Patient given Zofran, morphine, Toradol, and 1 L normal saline. Urology consulted. Patient made NPO and admitted to floor.   Hospital Course: Patient underwent uteroscopy with laser stone fragmentation.  The patient is hemodynamically stable and will be discharged home with a pain regimen consisting of Tylenol and or ibuprofen as well as Pyridium 100 mg 3 times daily for 5 days.  The patient will be provided with tamsulosin 0.4 mg daily as long as the stents are in place.    Pertinent Physical Exam At Time of Discharge  Physical Exam  Constitutional:       General: He is not in acute distress.  HENT:      Head: Normocephalic and atraumatic.      Nose: Nose normal.   Cardiovascular:      Heart sounds: Normal heart sounds.   Pulmonary:      Effort: Pulmonary effort is normal.      Breath sounds: Normal breath sounds.   Abdominal:      General: Bowel sounds are normal.      Palpations: Abdomen is soft.   Musculoskeletal:         General: Normal range of motion.   Skin:     General: Skin is warm.   Neurological:      General: No focal deficit present.      Mental Status: He is alert and oriented to person, place, and time. Mental status is at baseline.   Psychiatric:         Mood and Affect: Mood normal.         Behavior: Behavior normal.         Thought Content: Thought content normal.         Judgment: Judgment normal.         Outpatient Follow-Up  No future appointments.      Tory Mora MD

## 2024-03-02 NOTE — OP NOTE
Left ureteroscopy and laser stone fragmentation, double J stent insertion, retrograde pyelogram under flouroscopy (L) Operative Note     Date: 3/2/2024  OR Location: [facility] OR    Name: Shaun Rodriguez, : 1990, Age: 33 y.o., MRN: [id number], Sex: male    Diagnosis  Pre-op Diagnosis     * Left ureteral stone [N20.1] Post-op Diagnosis     * Left ureteral stone [N20.1]     Procedures  Cystoscopy  Left ureteroscopy and laser stone fragmentation and basket extraction of stone  Left retrograde pyelogram  Left double J stent insertion    Surgeons      * Walter Bills - Primary    Resident/Fellow/Other Assistant:  Surgeon(s) and Role:    Procedure Summary  Anesthesia: General  ASA: II  Anesthesia Staff: CRNA: JEF Rivero-CRNA  Anesthesia Break User: Alka Madden MD  Estimated Blood Loss: 1mL  Intra-op Medications:   Administrations occurring from 1100 to 1205 on 24:   Medication Name Total Dose   acetaminophen (Tylenol) tablet 975 mg Cannot be calculated   cefTRIAXone (Rocephin) IVPB 1 g Cannot be calculated   heparin (porcine) injection 5,000 Units Cannot be calculated   HYDROmorphone (Dilaudid) injection 0.6 mg Cannot be calculated   ondansetron (Zofran) injection 4 mg Cannot be calculated   ondansetron (Zofran) injection 4 mg Cannot be calculated   oxyCODONE (Roxicodone) immediate release tablet 10 mg Cannot be calculated   oxyCODONE (Roxicodone) immediate release tablet 5 mg Cannot be calculated   tamsulosin (Flomax) 24 hr capsule 0.4 mg Cannot be calculated              Anesthesia Record               Intraprocedure I/O Totals          Intake    lactated Ringer's bolus 1,000 mL 800.00 mL    Total Intake 800 mL          Specimen: No specimens collected     Staff:   Circulator: Milana Doherty RN  Scrub Person: Amy Gan         Drains and/or Catheters: * None in log *    Tourniquet Times:         Implants:  Implants       Type Name Action Serial No.      Implant STENT KIT, IMAJIN  HYDRO, 6FR X 28CM, POSITIONER, NO GUIDEWIRE - [id number] Implanted               Findings: 7m stone impacted in left proximal ureter    Indications: Shaun Rodriguez is an 33 y.o. male who is having surgery for Left ureteral stone [N20.1] and MACIE.    The patient was seen in the preoperative area. The risks, benefits, complications, treatment options, non-operative alternatives, expected recovery and outcomes were discussed with the patient. The possibilities of reaction to medication, pulmonary aspiration, injury to surrounding structures, bleeding, recurrent infection, the need for additional procedures, failure to diagnose a condition, and creating a complication requiring transfusion or operation were discussed with the patient. The patient concurred with the proposed plan, giving informed consent.  The site of surgery was properly noted/marked if necessary per policy. The patient has been actively warmed in preoperative area. Preoperative antibiotics have been ordered and given within 1 hours of incision. Venous thrombosis prophylaxis have been ordered including bilateral sequential compression devices    Procedure Details: Patient was consented and antibiotics were started on call to OR.  In the operating room, under general anesthesia, with the patient in dorsolithotomy position, genitalia was prepped and draped in the usual manner.  No.22 cystoscope was inserted down the urethra into the bladder, and cystoscopy revealed no stones or tumors. The ureteral orifices were identified in the normal orthotopic position.  The ureteral catheter was advanced through the cystoscope and a guidewire was inserted through the left ureteral orifice.  The cystoscope was removed and a double lumen catheter was inserted over the wire, then a super-stiff wire was introduced and the double lumen catheter was removed. Then a 10-12 x 45cm ureteral access sheath was advanced over the super-stiff wire until it was positioned near the  ureterovesical junction.  The inner sheath was removed with the super stiff wire.  The flexible ureteroscope was inserted through and the stone was encountered at the ureteropelvic junction.  Using the 365 micron laser fiber, the stone was fragmented into smaller pieces which migrated into the upper pole of the renal pelvis.  We followed the stones and continued working on them until there were no large fragments, and some of the stone fragments were extracted using the basket.    Contrast was injected through the ureteroscope, showing moderate hydronephrosis, and no filling defects.    Flexible ureteroscope and the ureteral access sheath were extracted under vision.  The cystoscope was reinserted over the wire.  A 6-28 double-J stent was inserted over the wire and position was adjusted by the pusher.  The proximal curl was seen by fluoroscopy to be in the topography of the kidney, while the distal curl was visualized directly in the bladder.    The bladder was emptied.  This concluded the procedure.  Patient tolerated the procedure well, was awakened and transferred to PACU in stable condition    Complications:  None; patient tolerated the procedure well.    Disposition: PACU - hemodynamically stable.  Condition: stable         Attending Attestation: I performed the procedure.    Walter Bills  Phone Number: [phone]

## 2024-03-02 NOTE — ANESTHESIA PREPROCEDURE EVALUATION
Patient: Shaun Rodriguez    Procedure Information       Date/Time: 03/02/24 1100    Procedure: Ureteral Lithotripsy Laser (Left)    Location: [facility] / [facility]    Surgeons: Walter Bills MD            Relevant Problems   /Renal   (+) Acute renal failure (ARF) (CMS/HCC)   (+) Nephrolithiasis       Clinical information reviewed:   Tobacco  Allergies  Meds  Problems  Med Hx  Surg Hx   Fam Hx  Soc   Hx      Vitals:    03/02/24 0936   BP: 133/83   Pulse: 70   Resp: 18   Temp: 36.2 °C (97.2 °F)   SpO2: 96%       History reviewed. No pertinent surgical history.  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    Nondisplaced fracture of distal phalanx of right great toe, initial encounter for open fracture 06/11/2019    Open nondisplaced fracture of distal phalanx of right great toe, initial encounter    Pain in right toe(s) 06/11/2019    Pain of toe of right foot    Personal history of other benign neoplasm 07/31/2015    History of other benign neoplasm    Personal history of other diseases of the nervous system and sense organs 07/31/2015    History of exotropia    Streptococcal pharyngitis 09/13/2019    Streptococcus pharyngitis    Unspecified exophthalmos 02/02/2015    Proptosis    Unspecified exophthalmos 07/31/2015    Exophthalmos, acquired    Unspecified open wound of unspecified toe(s) without damage to nail, initial encounter 06/25/2019    Open wound of toe, initial encounter       Current Facility-Administered Medications:     acetaminophen (Tylenol) tablet 975 mg, 975 mg, oral, 4x daily, Michael Deleon MD, 975 mg at 03/02/24 0601    cefTRIAXone (Rocephin) IVPB 1 g, 1 g, intravenous, q24h, Michael Deleon MD, Stopped at 03/02/24 0624    heparin (porcine) injection 5,000 Units, 5,000 Units, subcutaneous, q8h Michael DAVILA  MD Pancho    HYDROmorphone (Dilaudid) injection 0.6 mg, 0.6 mg, intravenous, q3h PRN, Michael Deleon MD    lactated Ringer's bolus 1,000 mL, 1,000 mL, intravenous, Once, Michael Deleon MD, Last Rate: 100 mL/hr at 03/02/24 0603, 1,000 mL at 03/02/24 0603    ondansetron (Zofran) injection 4 mg, 4 mg, intravenous, Once, Ismael Masters, DO    ondansetron (Zofran) injection 4 mg, 4 mg, intravenous, q4h PRN, Michael Deleon MD, 4 mg at 03/02/24 0554    oxyCODONE (Roxicodone) immediate release tablet 10 mg, 10 mg, oral, q6h PRN, Michael Deleon MD    oxyCODONE (Roxicodone) immediate release tablet 5 mg, 5 mg, oral, q6h PRN, Michael Deleon MD    tamsulosin (Flomax) 24 hr capsule 0.4 mg, 0.4 mg, oral, Daily, Michael Deleon MD, 0.4 mg at 03/02/24 0838  Prior to Admission medications    Medication Sig Start Date End Date Taking? Authorizing Provider   ibuprofen 600 mg tablet Take 1 tablet (600 mg) by mouth every 8 hours if needed for mild pain (1 - 3), fever (temp greater than 38.0 C) or moderate pain (4 - 6) for up to 10 days. 2/29/24 3/10/24  Manuelito Delgado DO   ondansetron (Zofran) 4 mg tablet Take 1 tablet (4 mg) by mouth every 6 hours for 3 days. 3/1/24 3/4/24  Brayden Rolon DO   oxyCODONE-acetaminophen (Percocet) 5-325 mg tablet Take 1 tablet by mouth every 6 hours if needed for severe pain (7 - 10) for up to 3 days. 3/1/24 3/4/24  Brayden Rolon DO   tamsulosin (Flomax) 0.4 mg 24 hr capsule Take 1 capsule (0.4 mg) by mouth once daily for 6 days. 2/29/24 3/6/24  Manuelito J Faflik, DO     No Known Allergies  Social History     Tobacco Use    Smoking status: Never    Smokeless tobacco: Never   Substance Use Topics    Alcohol use: Never         Chemistry    Lab Results   Component Value Date/Time     03/02/2024 0310    K 3.6 03/02/2024 0310     03/02/2024 0310    CO2 26 03/02/2024 0310    BUN 16 03/02/2024 0310    CREATININE 1.31 (H) 03/02/2024 0310    Lab Results   Component Value Date/Time    CALCIUM 8.7 03/02/2024 0310  "   ALKPHOS 50 03/02/2024 0310    AST 18 03/02/2024 0310    ALT 26 03/02/2024 0310    BILITOT 1.0 03/02/2024 0310          Lab Results   Component Value Date/Time    WBC 11.7 (H) 03/02/2024 0310    HGB 14.3 03/02/2024 0310    HCT 42.3 03/02/2024 0310     03/02/2024 0310     No results found for: \"PROTIME\", \"PTT\", \"INR\"  No results found for this or any previous visit (from the past 4464 hour(s)).  No results found for this or any previous visit from the past 1095 days.    NPO Detail:  No data recorded     Physical Exam    Airway  Mallampati: II  TM distance: >3 FB  Neck ROM: full     Cardiovascular   Rhythm: regular  Rate: normal     Dental - normal exam     Pulmonary - normal exam     Abdominal            Anesthesia Plan    History of general anesthesia?: unknown/emergency  History of complications of general anesthesia?: unknown/emergency    ASA 2     general     intravenous induction   Postoperative administration of opioids is intended.  Trial extubation is planned.  Anesthetic plan and risks discussed with patient.  Use of blood products discussed with patient who.    Plan discussed with CRNA and attending.      "

## 2024-03-02 NOTE — SIGNIFICANT EVENT
Shaun Rodriguez is a 33 y.o. male admitted for left ureteral stone with left side caliectasis with smaller stones located on right ureter.      Acute Medical Issues      # Nephrolithiasis  - CT A/P (2/29/24): 7 mm proximal left ureteral stone with trace left-sided calyectasis.  Small nonobstructing stones seen on the left.  Questionable tiny stone on the right  - Discharged from [facility] 2/29 with out-pt Urology follow up, but symptoms worsened and intolerable  - Rocephin 1g (3/2 - )  - PO Oxy PRN, IV Dilaudid (breakthrough)  - Flomax daily  -Urology completed a left ureteroscopy laser stone fragmentation with double-J stent insertion in the a.m.         # Acute Kidney Injury  - Likely 2/2 Obstructive Nephrolithiasis & hypovolemia  - Received IV Morphine in ED  - Avoid nephrotoxin meds  -  ml over 10 hours   - Monitor RFP daily     Chronica Medical Issues  None      F: LR 100cc x10 hrs  E: Replete PRN  N: NPO for Urology intervention -> Regular diet  GI ppx: N/A  DVT ppx: Heparin Subq  Antibiotics: Rocephin (3/2 -)  Tubes/Lines/Drains: PIVs  Code Status: Full Code     Disposition: 33 y.o.male admitted for 7 mm proximal left ureteral stone with trace left-sided calyectasis.  Completed ureteroscopy laser stone fragmentation this morning.  Dissipate discharge tomorrow.

## 2024-03-02 NOTE — ANESTHESIA POSTPROCEDURE EVALUATION
Patient: Shaun Rodriguez    Procedure Summary       Date: 03/02/24 Room / Location: [facility] / [facility]    Anesthesia Start: 1116 Anesthesia Stop: 1259    Procedure: Left ureteroscopy and laser stone fragmentation, double J stent insertion, retrograde pyelogram under flouroscopy (Left: Ureter) Diagnosis:       Left ureteral stone      (Left ureteral stone [N20.1])    Surgeons: Walter Bills MD Responsible Provider: SUSANA Rivero    Anesthesia Type: general ASA Status: 2            Anesthesia Type: general    Vitals Value Taken Time   /83 03/02/24 1259   Temp 36.2 03/02/24 1259   Pulse 70 03/02/24 1259   Resp 18 03/02/24 1259   SpO2 97 03/02/24 1259       Anesthesia Post Evaluation    Patient location during evaluation: PACU  Patient participation: complete - patient participated  Level of consciousness: sleepy but conscious  Pain management: adequate  Airway patency: patent  Cardiovascular status: acceptable  Respiratory status: acceptable  Hydration status: acceptable  Postoperative Nausea and Vomiting: none        No notable events documented.

## 2024-03-02 NOTE — ANESTHESIA PROCEDURE NOTES
Airway  Date/Time: 3/2/2024 11:27 AM  Urgency: elective    Airway not difficult    Staffing  Performed: CRNA   Authorized by: SUSANA Rivero    Performed by: JEF Rivero-PRIETO  Patient location during procedure: OR    Indications and Patient Condition  Indications for airway management: anesthesia  Spontaneous Ventilation: absent  Sedation level: deep  Preoxygenated: yes  Patient position: ramp  MILS not maintained throughout  Mask difficulty assessment: 0 - not attempted    Final Airway Details  Final airway type: supraglottic airway      Successful airway: Supraglottic airway: igel.  Size 4     Number of attempts at approach: 1  Number of other approaches attempted: 0

## 2024-03-02 NOTE — ED PROVIDER NOTES
History/Exam limitations: none  HPI was provided by patient    HPI:    Chief Complaint   Patient presents with    Flank Pain        Shaun Rodriguez is a 33 y.o. male chief complaint of flank pain.  Patient seen evaluated this is his third time in the past few days as he was found to have a kidney stone at [facility].  Has tried outpatient treatments but not turning it well or keeping down p.o. and brought him here.  He has no history of kidney stones and the symptoms all began on Tuesday before he found out he had a kidney stone at that time once CT was performed.  He does not have a urologist he is ever seen.  Denies any fever or chills.  Does admit to left-sided flank pain that he states is constant but no radiation or abdominal pain.  Does admit to decreased urine output.  Pain worse with palpation.       ROS:  All other review of systems are negative except as noted above and HPI or ROS.   CONSTITUTIONAL: fever, chills  ENT: sore throat, congestion, rhinorrhea  CARDIOVASCULAR: chest pain, palpitations, swelling  RESPIRATORY: cough, wheeze, shortness of breath  GI: nausea, vomiting, diarrhea, abdominal pain,  black or tarry stools, constipation  GENITOURINARY: dysuria, hematuria, frequency  MUSCULOSKELETAL: deformity, neck pain  SKIN: rash, lesion  NEUROLOGIC: headache, numbness, focal weakness  NOTES: All systems reviewed, negative except as described above     Physical Exam:  GENERAL: Alert, oriented , cooperative,  in no acute distress.  HEAD: normocephalic, atraumatic  SKIN:  dry skin, no lesions.  EYES: PERRL, EOMs intact,  Conjunctiva pink with no erythema or exudates. No scleral icterus.   ENT: No external deformities. Nares patent, mucus membranes moist.  Pharynx clear, uvula midline.   NECK: Supple, without meningismus. Trachea at midline. No lymphadenopathy.  PULMONARY: Clear bilaterally. No crackles, rhonchi, wheezing.  No respiratory distress.  No work of breathing.  CARDIAC: Regular rate and regular  rhythm.  Pulses 2+ in radials and dorsal pedal pulses bilaterally.  No murmur, rub, gallop.  No edema.  ABDOMEN: Soft, nontender, active bowel sounds.  No palpable organomegaly.  No rebound or guarding.  Left-sided CVA tenderness.  No pulsatile masses.  Negative Abbasi sign, negative McBurney point  MUSCULOSKELETAL: Full range of motion throughout, no deformity.   NEUROLOGICAL:  no focal neuro deficits.    PSYCHIATRIC: Appropriate mood and affect. Calm.    Medical Decision Making:     The patient presented for evaluation of flank pain.  Differential included but not limited to UTI, septic stone.  Patient was given Zofran, morphine, IV fluids.  He is already failed outpatient treatment.  Plan be for lab work and consult urology and admission.  Has evaluated known 7 mm obstructing stone on the left side.  Considered reimaging but he just had it done a few days ago.     External Records Reviewed: I reviewed recent and relevant outside records    Patient requires continued workup and management of their symptoms and will be admitted to the hospital for further evaluation and treatment.        I discussed the differential, results and plan with the patient and/or family/friend/caregiver if present.           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    Nondisplaced fracture of distal phalanx of right great toe, initial encounter for open fracture 06/11/2019    Open nondisplaced fracture of distal phalanx of right great toe, initial encounter    Pain in right toe(s) 06/11/2019    Pain of toe of right foot    Personal history of other benign neoplasm 07/31/2015    History of other benign neoplasm    Personal history of other diseases of the nervous system and sense organs 07/31/2015    History of exotropia    Streptococcal pharyngitis 09/13/2019     Streptococcus pharyngitis    Unspecified exophthalmos 02/02/2015    Proptosis    Unspecified exophthalmos 07/31/2015    Exophthalmos, acquired    Unspecified open wound of unspecified toe(s) without damage to nail, initial encounter 06/25/2019    Open wound of toe, initial encounter      Social History     Socioeconomic History    Marital status:      Spouse name: None    Number of children: None    Years of education: None    Highest education level: None   Occupational History    None   Tobacco Use    Smoking status: Never    Smokeless tobacco: Never   Substance and Sexual Activity    Alcohol use: Never    Drug use: Never    Sexual activity: None   Other Topics Concern    None   Social History Narrative    None     Social Determinants of Health     Financial Resource Strain: Not on file   Food Insecurity: Not on file   Transportation Needs: Not on file   Physical Activity: Not on file   Stress: Not on file   Social Connections: Not on file   Intimate Partner Violence: Not on file   Housing Stability: Not on file     Current Outpatient Medications   Medication Instructions    ibuprofen 600 mg, oral, Every 8 hours PRN    ondansetron (ZOFRAN) 4 mg, oral, Every 6 hours    oxyCODONE-acetaminophen (Percocet) 5-325 mg tablet 1 tablet, oral, Every 6 hours PRN    tamsulosin (FLOMAX) 0.4 mg, oral, Daily     No Known Allergies        ED Triage Vitals [03/02/24 0300]   Temperature Heart Rate Respirations BP   36.3 °C (97.3 °F) 83 15 (!) 159/97      Pulse Ox Temp Source Heart Rate Source Patient Position   96 % Temporal -- --      BP Location FiO2 (%)     -- --               Labs and Imaging were reviewed and discussed with patient          ED Course as of 03/02/24 0503   Sat Mar 02, 2024   0326 Reviewed CT done few days ago does show 7 mm proximal left ureteral stone with trace left-sided  caliectasis   [WL]   0453 Creatinine(!): 1.31 [WL]   0455 Consult to Urology Dr. Bills, like patient to be NPO.  Plan will be  to admit to medicine. [WL]   0500 Was made to hospitalist service, Katarzyna who agrees on admission.  At this time are still awaiting urine. [WL]      ED Course User Index  [WL] Ismael Masters DO         Diagnoses as of 03/02/24 0503   Obstructive uropathy   Kidney stones   MACIE (acute kidney injury) (CMS/[facility])               Procedure  Procedures         Note: This note was dictated by speech recognition. Minor errors in transcription may be present.          Ismael Masters DO  03/02/24 0505

## 2024-03-02 NOTE — CONSULTS
Consults    Reason For Consult  Left ureteral stone and MACIE    History Of Present Illness  Shaun Rodriguez is a 33 y.o. male healthy presenting with left sided flank pain that radiates to his abdomen and groin, of 4 days and it was initially a 3/10. In [facility] ED on Thursday, he was diagnosed with 7mm left ureteral stone seen on CT in proximal left ureteral stone with trace left-sided caliectasis and additional tiny non-obstructing stones seen on left. He was discharged with Flomax and pain medication. He returned after his pain increased to 10/10 with associated nausea and vomiting. Denies blood in the urine or pain with urination. States that urine is dark and with decreased output. No history of kidney stones.     Denies fever or chills.    Creatinine in ED shows an MACIE     Past Medical History  He has a past medical history of Neoplasm of unspecified behavior of other specified sites (04/14/2015), Neoplasm of unspecified behavior of other specified sites (07/31/2015), Nondisplaced fracture of distal phalanx of right great toe, initial encounter for open fracture (06/11/2019), Pain in right toe(s) (06/11/2019), Personal history of other benign neoplasm (07/31/2015), Personal history of other diseases of the nervous system and sense organs (07/31/2015), Streptococcal pharyngitis (09/13/2019), Unspecified exophthalmos (02/02/2015), Unspecified exophthalmos (07/31/2015), and Unspecified open wound of unspecified toe(s) without damage to nail, initial encounter (06/25/2019).    Surgical History  He has no past surgical history on file.     Social History  He reports that he has never smoked. He has never used smokeless tobacco. He reports that he does not drink alcohol and does not use drugs.    Family History  No family history on file.     Allergies  Patient has no known allergies.    Review of Systems   All other systems reviewed and are negative.     Physical Exam   Cardiovascular:      Rate and Rhythm: Normal  rate and regular rhythm.      Pulses: Normal pulses.      Heart sounds: Normal heart sounds.   Pulmonary:      Effort: Pulmonary effort is normal.      Breath sounds: Normal breath sounds.   Abdominal:      General: Abdomen is flat.      Palpations: Abdomen is soft.      Tenderness: There is no abdominal tenderness. There is no right CVA tenderness or left CVA tenderness.   Skin:     General: Skin is warm and dry.   Neurological:      Mental Status: He is alert and oriented to person, place, and time.   Psychiatric:         Mood and Affect: Mood normal.         Behavior: Behavior normal.     Last Recorded Vitals  Blood pressure 133/83, pulse 70, temperature 36.2 °C (97.2 °F), temperature source Temporal, resp. rate 18, height 1.829 m (6'), weight 97.1 kg (214 lb), SpO2 96 %.    Relevant Results  Results for orders placed or performed during the hospital encounter of 03/02/24 (from the past 96 hour(s))   CBC and Auto Differential   Result Value Ref Range    WBC 11.7 (H) 4.4 - 11.3 x10*3/uL    nRBC 0.0 0.0 - 0.0 /100 WBCs    RBC 5.06 4.50 - 5.90 x10*6/uL    Hemoglobin 14.3 13.5 - 17.5 g/dL    Hematocrit 42.3 41.0 - 52.0 %    MCV 84 80 - 100 fL    MCH 28.3 26.0 - 34.0 pg    MCHC 33.8 32.0 - 36.0 g/dL    RDW 12.4 11.5 - 14.5 %    Platelets 179 150 - 450 x10*3/uL    Neutrophils % 82.9 40.0 - 80.0 %    Immature Granulocytes %, Automated 0.3 0.0 - 0.9 %    Lymphocytes % 7.8 13.0 - 44.0 %    Monocytes % 8.1 2.0 - 10.0 %    Eosinophils % 0.6 0.0 - 6.0 %    Basophils % 0.3 0.0 - 2.0 %    Neutrophils Absolute 9.74 (H) 1.20 - 7.70 x10*3/uL    Immature Granulocytes Absolute, Automated 0.04 0.00 - 0.70 x10*3/uL    Lymphocytes Absolute 0.91 (L) 1.20 - 4.80 x10*3/uL    Monocytes Absolute 0.95 0.10 - 1.00 x10*3/uL    Eosinophils Absolute 0.07 0.00 - 0.70 x10*3/uL    Basophils Absolute 0.03 0.00 - 0.10 x10*3/uL   Comprehensive Metabolic Panel   Result Value Ref Range    Glucose 117 (H) 74 - 99 mg/dL    Sodium 137 136 - 145 mmol/L     Potassium 3.6 3.5 - 5.3 mmol/L    Chloride 103 98 - 107 mmol/L    Bicarbonate 26 21 - 32 mmol/L    Anion Gap 12 10 - 20 mmol/L    Urea Nitrogen 16 6 - 23 mg/dL    Creatinine 1.31 (H) 0.50 - 1.30 mg/dL    eGFR 74 >60 mL/min/1.73m*2    Calcium 8.7 8.6 - 10.3 mg/dL    Albumin 4.2 3.4 - 5.0 g/dL    Alkaline Phosphatase 50 33 - 120 U/L    Total Protein 6.6 6.4 - 8.2 g/dL    AST 18 9 - 39 U/L    Bilirubin, Total 1.0 0.0 - 1.2 mg/dL    ALT 26 10 - 52 U/L   Lipase   Result Value Ref Range    Lipase 20 9 - 82 U/L   Lactate   Result Value Ref Range    Lactate 0.7 0.4 - 2.0 mmol/L     CT abdomen pelvis wo IV contrast    Result Date: 2/29/2024  Interpreted By:  Mark Zavaleta, STUDY: CT ABDOMEN PELVIS WO IV CONTRAST;  2/29/2024 3:56 pm   INDICATION: Signs/Symptoms:Left flank pain.   COMPARISON: None.   ACCESSION NUMBER(S): [id number]   ORDERING CLINICIAN: SHIRA CHESTER   TECHNIQUE: CT of the abdomen and pelvis was performed. No oral, no intravenous contrast.   FINDINGS: LOWER CHEST: Images of the lung bases show no infiltrate or pleural fluid.   ABDOMEN:   LIVER: There is no hepatic mass.   BILE DUCTS: There is no intrahepatic, common hepatic or common bile ductal dilatation.   GALLBLADDER: The gallbladder is unremarkable.   PANCREAS: The pancreas is unremarkable.   SPLEEN: The spleen is unremarkable. There is no splenic mass or splenomegaly.   ADRENAL GLANDS: The adrenal glands are unremarkable.   KIDNEYS, URETERS and BLADDER: The kidneys demonstrate no mass.  7 mm proximal left ureteral stone. Mild caliectasis on the left. Additional tiny nonobstructing stones seen about the inferior calices of the left kidney. Questionable tiny nonobstructing stone about the inferior pole of the right kidney. Urinary bladder is unremarkable..   BOWEL: There is no bowel wall thickening, dilatation or obstruction. Appendix unremarkable.   VESSELS: No aneurysm. IVC within normal limits.   PERITONEUM/RETROPERITONEUM/LYMPH NODES: There is no  retroperitoneal or pelvic adenopathy.  There is no ascites.   REPRODUCTIVE ORGANS: Unremarkable.   ABDOMINAL WALL: The abdominal wall is unremarkable.   BONE AND SOFT TISSUE: There is no acute osseous finding.   There is no soft tissue abnormality.       1. 7 mm proximal left ureteral stone with trace left-sided caliectasis. Additional tiny nonobstructing stones seen on the left. Questionable tiny stone on the right.   MACRO: None.   Signed by: Mark Zavaleta 2/29/2024 4:02 PM Dictation workstation:   [id number]        Assessment/Plan   32 yo male healthy with 7mm obstructive left proximal ureteral stone with MACIE, pain 10/10 failed medical therapy.    I personally reviewed the CT images.    I discussed the options with the patient including stent only vs. Ureteroscopy and laser fragmentation with stent and we will attempt to proceed with ureteroscopy and completion of stone fragmentation.    Risks and benefits reviewed.    Plan:  Proceed to OR now for ureteroscopy and laser stone fragmentation and stent insertion    I spent 60 minutes in the professional and overall care of this patient.

## 2024-03-02 NOTE — PROGRESS NOTES
03/02/24 0843   Discharge Planning   Living Arrangements Spouse/significant other   Support Systems Spouse/significant other   Assistance Needed X3, independent in ADLs, ambulates without assistive devices, No O2, CPAP or Bipap at baseline   Type of Residence Private residence   Number of Stairs to Enter Residence 4   Number of Stairs Within Residence 14  (flight upstairs)   Do you have animals or pets at home? No   Who is requesting discharge planning? Provider   Home or Post Acute Services None   Patient expects to be discharged to: Home, No needs-denied need for HHC   Does the patient need discharge transport arranged? No   Financial Resource Strain   How hard is it for you to pay for the very basics like food, housing, medical care, and heating? Not hard   Housing Stability   In the last 12 months, was there a time when you were not able to pay the mortgage or rent on time? N   In the last 12 months, how many places have you lived? 1   In the last 12 months, was there a time when you did not have a steady place to sleep or slept in a shelter (including now)? N   Transportation Needs   In the past 12 months, has lack of transportation kept you from medical appointments or from getting medications? no   In the past 12 months, has lack of transportation kept you from meetings, work, or from getting things needed for daily living? No

## 2024-03-02 NOTE — PROGRESS NOTES
03/02/2024 1553pm  Made aware patient to discharge to home today and denied any home going needs. Patient cleared for dc from transitions standpoint.

## 2024-03-02 NOTE — H&P
Patient: Shaun Rodriguez  Room/bed: AC05/AC05  Admitted on: 3/2/2024    Age: 33 y.o. Gender: Male  Code Status:  Full Code   Admitting Dx: No admission diagnoses are documented for this encounter.    MRN: [id number]  PCP: No Assigned PCP Generic Provider, MD  Preferred Pharm: [unfilled]    Attending: Connor Lynn MD Resident: David Lawson DO  TCC: No care team member to display      Chief Complaint   Patient: Shaun Rodriguez is a 33 y.o. male who presented to the hospital for left sided flank pain.    HPI   HPI: Patient is a 33 y.o male with no significant pmhx presenting with left sided flank pain that radiates to his abdomen and groin. Patient first noticed this pain on Tuesday and it was initially a 3/10. He visited [facility] ED on Thursday where they diagnosed him with left ureteral stone seen on CT as a 7 mm proximal left ureteral stone with trace left-sided caliectasis and additional tiny non-obstructing stones seen on left. He was discharged with Flomax and pain medication. He returned after his pain increased to 10/10 with associated nausea and vomiting. Denies blood in the urine or pain with urination. States that urine is dark and with decreased output. No history of kidney stones.    Notes that he did experience some chest pressure/discomfort in the center of his chest yesterday after taking an oxycodone. Sensation did not radiate. This dissipated on it's own after a couple of hours. No SOB. No personal cardiac history. Aunt had MI at 53.    ROS  Review of Systems   All other systems reviewed and are negative.     ED Course: Patient given Zofran, morphine, Toradol, and 1 L normal saline. Urology consulted. Patient made NPO and admitted to floor.    ED Course   Vitals - BP (!) 142/95   Pulse 89   Temp 36.3 °C (97.3 °F) (Temporal)   Resp 15   Wt 97.1 kg (214 lb)   SpO2 97%   Labs -   Lab Results   Component Value Date    WBC 11.7 (H) 03/02/2024    HGB 14.3 03/02/2024    HCT 42.3 03/02/2024  "   MCV 84 03/02/2024     03/02/2024     Lab Results   Component Value Date    GLUCOSE 117 (H) 03/02/2024    CALCIUM 8.7 03/02/2024     03/02/2024    K 3.6 03/02/2024    CO2 26 03/02/2024     03/02/2024    BUN 16 03/02/2024    CREATININE 1.31 (H) 03/02/2024   No results found for: \"TROPHS\"No results found for: \"BNP\"No results found for: \"DDIMERVTE\"  Imaging -   No orders to display      Interventions -   Medications   sodium chloride 0.9 % bolus 1,000 mL (has no administration in time range)   ondansetron (Zofran) injection 4 mg (has no administration in time range)   sodium chloride 0.9 % bolus 1,000 mL (1,000 mL intravenous New Bag 3/2/24 0759)   morphine injection 4 mg (4 mg intravenous Given 3/2/24 3329)       Past Medical History     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    Nondisplaced fracture of distal phalanx of right great toe, initial encounter for open fracture 06/11/2019    Open nondisplaced fracture of distal phalanx of right great toe, initial encounter    Pain in right toe(s) 06/11/2019    Pain of toe of right foot    Personal history of other benign neoplasm 07/31/2015    History of other benign neoplasm    Personal history of other diseases of the nervous system and sense organs 07/31/2015    History of exotropia    Streptococcal pharyngitis 09/13/2019    Streptococcus pharyngitis    Unspecified exophthalmos 02/02/2015    Proptosis    Unspecified exophthalmos 07/31/2015    Exophthalmos, acquired    Unspecified open wound of unspecified toe(s) without damage to nail, initial encounter 06/25/2019    Open wound of toe, initial encounter        Surgical History   History reviewed. No pertinent surgical history.    Family History   No family history on file.    Social History     Social History "     Socioeconomic History    Marital status:      Spouse name: Not on file    Number of children: Not on file    Years of education: Not on file    Highest education level: Not on file   Occupational History    Not on file   Tobacco Use    Smoking status: Never    Smokeless tobacco: Never   Substance and Sexual Activity    Alcohol use: Never    Drug use: Never    Sexual activity: Not on file   Other Topics Concern    Not on file   Social History Narrative    Not on file     Social Determinants of Health     Financial Resource Strain: Not on file   Food Insecurity: Not on file   Transportation Needs: Not on file   Physical Activity: Not on file   Stress: Not on file   Social Connections: Not on file   Intimate Partner Violence: Not on file   Housing Stability: Not on file       Tobacco Use: Low Risk  (3/2/2024)    Patient History     Smoking Tobacco Use: Never     Smokeless Tobacco Use: Never     Passive Exposure: Not on file        Social History     Substance and Sexual Activity   Alcohol Use Never        Allergies   No Known Allergies     Objective    Physical Exam  Cardiovascular:      Rate and Rhythm: Normal rate and regular rhythm.      Pulses: Normal pulses.      Heart sounds: Normal heart sounds.   Pulmonary:      Effort: Pulmonary effort is normal.      Breath sounds: Normal breath sounds.   Abdominal:      General: Abdomen is flat.      Palpations: Abdomen is soft.      Tenderness: There is no abdominal tenderness. There is no right CVA tenderness or left CVA tenderness.   Skin:     General: Skin is warm and dry.   Neurological:      Mental Status: He is alert and oriented to person, place, and time.   Psychiatric:         Mood and Affect: Mood normal.         Behavior: Behavior normal.          Visit Vitals  BP (!) 142/95   Pulse 89   Temp 36.3 °C (97.3 °F) (Temporal)   Resp 15   Ht 1.829 m (6')   Wt 97.1 kg (214 lb)   SpO2 97%   BMI 29.02 kg/m²   Smoking Status Never   BSA 2.22 m²        No intake  "or output data in the 24 hours ending 03/02/24 0506          Meds    Scheduled medications  ondansetron, 4 mg, intravenous, Once  sodium chloride, 1,000 mL, intravenous, Once      Continuous medications     PRN medications       Labs:   Results from last 72 hours   Lab Units 03/02/24  0310 02/29/24  1531   SODIUM mmol/L 137 137   POTASSIUM mmol/L 3.6 3.5   CHLORIDE mmol/L 103 104   CO2 mmol/L 26 25   BUN mg/dL 16 14   CREATININE mg/dL 1.31* 0.90   GLUCOSE mg/dL 117* 124*   CALCIUM mg/dL 8.7 9.4   ANION GAP mmol/L 12 8   EGFR mL/min/1.73m*2 74 >90      Results from last 72 hours   Lab Units 03/02/24  0310 02/29/24  1531   WBC AUTO x10*3/uL 11.7* 7.6   HEMOGLOBIN g/dL 14.3 15.0   HEMATOCRIT % 42.3 43.1   PLATELETS AUTO x10*3/uL 179 200   NEUTROS PCT AUTO % 82.9 71.0   LYMPHS PCT AUTO % 7.8 19.5   MONOS PCT AUTO % 8.1 7.2   EOS PCT AUTO % 0.6 1.6      Lab Results   Component Value Date    CALCIUM 8.7 03/02/2024      No results found for: \"CRP\"    [unfilled]     Micro/ID:   No results found for the last 90 days.                   No lab exists for component: \"AGALPCRNB\"   .ID  No results found for: \"URINECULTURE\", \"BLOODCULT\", \"CSFCULTSMEAR\"    Images    CT abdomen pelvis wo IV contrast    Result Date: 2/29/2024  Interpreted By:  Mark Zavaleta, STUDY: CT ABDOMEN PELVIS WO IV CONTRAST;  2/29/2024 3:56 pm   INDICATION: Signs/Symptoms:Left flank pain.   COMPARISON: None.   ACCESSION NUMBER(S): [id number]   ORDERING CLINICIAN: SHIRA CHESTER   TECHNIQUE: CT of the abdomen and pelvis was performed. No oral, no intravenous contrast.   FINDINGS: LOWER CHEST: Images of the lung bases show no infiltrate or pleural fluid.   ABDOMEN:   LIVER: There is no hepatic mass.   BILE DUCTS: There is no intrahepatic, common hepatic or common bile ductal dilatation.   GALLBLADDER: The gallbladder is unremarkable.   PANCREAS: The pancreas is unremarkable.   SPLEEN: The spleen is unremarkable. There is no splenic mass or splenomegaly.   " ADRENAL GLANDS: The adrenal glands are unremarkable.   KIDNEYS, URETERS and BLADDER: The kidneys demonstrate no mass.  7 mm proximal left ureteral stone. Mild caliectasis on the left. Additional tiny nonobstructing stones seen about the inferior calices of the left kidney. Questionable tiny nonobstructing stone about the inferior pole of the right kidney. Urinary bladder is unremarkable..   BOWEL: There is no bowel wall thickening, dilatation or obstruction. Appendix unremarkable.   VESSELS: No aneurysm. IVC within normal limits.   PERITONEUM/RETROPERITONEUM/LYMPH NODES: There is no retroperitoneal or pelvic adenopathy.  There is no ascites.   REPRODUCTIVE ORGANS: Unremarkable.   ABDOMINAL WALL: The abdominal wall is unremarkable.   BONE AND SOFT TISSUE: There is no acute osseous finding.   There is no soft tissue abnormality.       1. 7 mm proximal left ureteral stone with trace left-sided caliectasis. Additional tiny nonobstructing stones seen on the left. Questionable tiny stone on the right.   MACRO: None.   Signed by: Mark Zavaleta 2/29/2024 4:02 PM Dictation workstation:   [id number]     No results found for this or any previous visit (from the past 4464 hour(s)).   No results found for this or any previous visit (from the past 4464 hour(s)).   @CT@   [unfilled]   [unfilled]   === 02/29/24 ===    CT ABDOMEN PELVIS WO IV CONTRAST    - Impression -  1. 7 mm proximal left ureteral stone with trace left-sided  caliectasis. Additional tiny nonobstructing stones seen on the left.  Questionable tiny stone on the right.    MACRO:  None.    Signed by: Mark Zavaleta 2/29/2024 4:02 PM  Dictation workstation:   [id number]     Assessment and Plan    Shaun Rodriguez is a 33 y.o. male admitted for left ureteral stone with left side caliectasis with smaller stones located on right ureter.     Acute Medical Issues     # Nephrolithiasis  - CT A/P (2/29/24): 7 mm proximal left ureteral stone with trace left-sided calyectasis.  Small  nonobstructing stones seen on the left.  Questionable tiny stone on the right  - Discharged from [facility] ED 2/29 with out-pt Urology follow up, but symptoms worsened and intolerable  - Rocephin 1g (3/2 - )  - PO Oxy PRN, IV Dilaudid (breakthrough)  - Flomax daily  - NPO with sips meds -> Urology intervention Sat AM  - Urology consulted (Dr. Bills)     # Acute Kidney Injury  - Likely 2/2 Obstructive Nephrolithiasis & hypovolemia  - Received IV Morphine in ED  - Avoid nephrotoxin meds  -  ml over 10 hours   - Monitor RFP daily    Chronica Medical Issues  None     F: LR 100cc x10 hrs  E: Replete PRN  N: NPO for Urology intervention -> Regular diet  GI ppx: N/A  DVT ppx: Heparin Subq  Antibiotics: Rocephin (3/2 -)  Tubes/Lines/Drains: PIVs  Code Status: Full Code    Disposition: 33 y.o.male admitted for 7 mm proximal left ureteral stone with trace left-sided calyectasis. Urology consulted, NPO for intervention. Anticipate LOS < 2 Midnights.    David Lawson  Internal Medicine, PGY-1

## 2024-03-02 NOTE — DISCHARGE INSTRUCTIONS
Please take your medications as instructed at time of hospital discharge.     Instructions at Discharge:  -please take Pyridium 100 mg 3 times daily for the next 5 days  -please follow-up with your PCP       Please follow-up with your primary care provider within 5-7 days from time of discharge. If you experience any worsening symptoms or any new acute concerns arise, please contact your primary care provider to discuss and possibly arrange an appointment. If you cannot get in touch with provider or severe symptoms are present, please return to nearest emergency room/urgent care for evaluation and treatment.

## 2024-03-22 ENCOUNTER — PROCEDURE VISIT (OUTPATIENT)
Dept: UROLOGY | Facility: CLINIC | Age: 34
End: 2024-03-22
Payer: COMMERCIAL

## 2024-03-22 DIAGNOSIS — N20.1 URETERAL CALCULUS, LEFT: Primary | ICD-10-CM

## 2024-03-22 LAB
POC APPEARANCE, URINE: CLEAR
POC BILIRUBIN, URINE: NEGATIVE
POC BLOOD, URINE: ABNORMAL
POC COLOR, URINE: YELLOW
POC GLUCOSE, URINE: NEGATIVE MG/DL
POC KETONES, URINE: NEGATIVE MG/DL
POC LEUKOCYTES, URINE: ABNORMAL
POC NITRITE,URINE: NEGATIVE
POC PH, URINE: 7 PH
POC PROTEIN, URINE: ABNORMAL MG/DL
POC SPECIFIC GRAVITY, URINE: 1.02
POC UROBILINOGEN, URINE: 0.2 EU/DL

## 2024-03-22 PROCEDURE — 52310 CYSTOSCOPY AND TREATMENT: CPT | Performed by: STUDENT IN AN ORGANIZED HEALTH CARE EDUCATION/TRAINING PROGRAM

## 2024-03-22 NOTE — PROGRESS NOTES
Patient ID: Shaun Rodriguez is a 33 y.o. male.    Procedures  Patient ID: Shaun Rodriguez is a 33 y.o. male s/p left ureteroscopy and laser stone fragmentation, double J stent insertion, retrograde pyelogram under fluoroscopy on 3/2/24. He recovered well from procedure. No major incidents postoperatively.      Procedures  PROCEDURE NOTE:     PREOPERATIVE DIAGNOSIS:  left ureteral stone     POSTOPERATIVE DIAGNOSIS:  Same     OPERATION:  Flexible Cystourethroscopy  Removal of left double J stent     SURGEON:  Walter Bills MD     ANESTHESIA:  2%  lidocaine jelly     COMPLICATIONS:  None     EBL: Minimal     SPECIMEN:  Voided urine was not collected and submitted for culture.     DISPOSITION:  The patient was discharged home after the procedure, per routine.     INDICATIONS: :  Patient with a history of left ureteral stone who underwent left ureteroscopy and laser stone fragmentation with stent placement,  who presents today for Cystoscopy and stent removal.      The indications, risks and benefits of this procedure were discussed with the patient, consent was obtained prior to the procedure, and to the best of my judgement the patient seemed to understand and agree to the procedure.     PROCEDURE:  The patient  was brought into the procedure suite and informed consent was reviewed and confirmed. Vital signs were obtained prior to the procedure: There were no vitals taken for this visit.  The patient was escorted onto the stretcher, placed supine, prepped with betadine and draped in the usual standard surgical fashion.  Intraurethral 2% viscous lidocaine jelly was used for local analgesia.  A 16 [place]inese flexible cystourethroscope was inserted into the urethra.   The urethra was normal.  Upon entering the bladder the entire bladder was surveyed in a 360 degree fashion.  The left and right ureteral orifices were in normal orthotopic position with the stent protruding out of the left ureteral orifice.   Using the  stent grasper, the left double J stent was grasped and pulled out of the urethra.   The patient tolerated the procedure well.  Vitals were stable after the procedure.  The patient was able to void and was discharged home.  Verbal and written Post procedure instructions were reviewed with the patient.     IMPRESSION:  Left ureteral stent removed smoothly     PLAN:  Encourage hydration  Follow up as needed

## 2025-04-06 ENCOUNTER — HOSPITAL ENCOUNTER (EMERGENCY)
Facility: HOSPITAL | Age: 35
Discharge: HOME | End: 2025-04-06
Attending: STUDENT IN AN ORGANIZED HEALTH CARE EDUCATION/TRAINING PROGRAM
Payer: COMMERCIAL

## 2025-04-06 ENCOUNTER — APPOINTMENT (OUTPATIENT)
Dept: RADIOLOGY | Facility: HOSPITAL | Age: 35
End: 2025-04-06
Payer: COMMERCIAL

## 2025-04-06 VITALS
DIASTOLIC BLOOD PRESSURE: 75 MMHG | OXYGEN SATURATION: 99 % | BODY MASS INDEX: 28.99 KG/M2 | WEIGHT: 214 LBS | HEART RATE: 70 BPM | RESPIRATION RATE: 15 BRPM | TEMPERATURE: 97.7 F | SYSTOLIC BLOOD PRESSURE: 141 MMHG | HEIGHT: 72 IN

## 2025-04-06 DIAGNOSIS — R10.9 FLANK PAIN: Primary | ICD-10-CM

## 2025-04-06 LAB
ALBUMIN SERPL BCP-MCNC: 4.5 G/DL (ref 3.4–5)
ALP SERPL-CCNC: 53 U/L (ref 33–120)
ALT SERPL W P-5'-P-CCNC: 24 U/L (ref 10–52)
ANION GAP SERPL CALC-SCNC: 13 MMOL/L (ref 10–20)
APPEARANCE UR: CLEAR
AST SERPL W P-5'-P-CCNC: 16 U/L (ref 9–39)
BASOPHILS # BLD AUTO: 0.04 X10*3/UL (ref 0–0.1)
BASOPHILS NFR BLD AUTO: 0.6 %
BILIRUB SERPL-MCNC: 0.7 MG/DL (ref 0–1.2)
BILIRUB UR STRIP.AUTO-MCNC: NEGATIVE MG/DL
BUN SERPL-MCNC: 13 MG/DL (ref 6–23)
CALCIUM SERPL-MCNC: 8.7 MG/DL (ref 8.6–10.3)
CHLORIDE SERPL-SCNC: 104 MMOL/L (ref 98–107)
CO2 SERPL-SCNC: 27 MMOL/L (ref 21–32)
COLOR UR: NORMAL
CREAT SERPL-MCNC: 0.74 MG/DL (ref 0.5–1.3)
EGFRCR SERPLBLD CKD-EPI 2021: >90 ML/MIN/1.73M*2
EOSINOPHIL # BLD AUTO: 0.24 X10*3/UL (ref 0–0.7)
EOSINOPHIL NFR BLD AUTO: 3.6 %
ERYTHROCYTE [DISTWIDTH] IN BLOOD BY AUTOMATED COUNT: 12.2 % (ref 11.5–14.5)
GLUCOSE SERPL-MCNC: 97 MG/DL (ref 74–99)
GLUCOSE UR STRIP.AUTO-MCNC: NORMAL MG/DL
HCT VFR BLD AUTO: 43 % (ref 41–52)
HGB BLD-MCNC: 15 G/DL (ref 13.5–17.5)
HOLD SPECIMEN: NORMAL
IMM GRANULOCYTES # BLD AUTO: 0.02 X10*3/UL (ref 0–0.7)
IMM GRANULOCYTES NFR BLD AUTO: 0.3 % (ref 0–0.9)
KETONES UR STRIP.AUTO-MCNC: NEGATIVE MG/DL
LACTATE SERPL-SCNC: 0.7 MMOL/L (ref 0.4–2)
LEUKOCYTE ESTERASE UR QL STRIP.AUTO: NEGATIVE
LIPASE SERPL-CCNC: 17 U/L (ref 9–82)
LYMPHOCYTES # BLD AUTO: 1.61 X10*3/UL (ref 1.2–4.8)
LYMPHOCYTES NFR BLD AUTO: 24.1 %
MCH RBC QN AUTO: 28.5 PG (ref 26–34)
MCHC RBC AUTO-ENTMCNC: 34.9 G/DL (ref 32–36)
MCV RBC AUTO: 82 FL (ref 80–100)
MONOCYTES # BLD AUTO: 0.49 X10*3/UL (ref 0.1–1)
MONOCYTES NFR BLD AUTO: 7.3 %
NEUTROPHILS # BLD AUTO: 4.29 X10*3/UL (ref 1.2–7.7)
NEUTROPHILS NFR BLD AUTO: 64.1 %
NITRITE UR QL STRIP.AUTO: NEGATIVE
NRBC BLD-RTO: 0 /100 WBCS (ref 0–0)
PH UR STRIP.AUTO: 7 [PH]
PLATELET # BLD AUTO: 186 X10*3/UL (ref 150–450)
POTASSIUM SERPL-SCNC: 3.9 MMOL/L (ref 3.5–5.3)
PROT SERPL-MCNC: 6.3 G/DL (ref 6.4–8.2)
PROT UR STRIP.AUTO-MCNC: NEGATIVE MG/DL
RBC # BLD AUTO: 5.26 X10*6/UL (ref 4.5–5.9)
RBC # UR STRIP.AUTO: NEGATIVE MG/DL
SODIUM SERPL-SCNC: 140 MMOL/L (ref 136–145)
SP GR UR STRIP.AUTO: 1.02
UROBILINOGEN UR STRIP.AUTO-MCNC: NORMAL MG/DL
WBC # BLD AUTO: 6.7 X10*3/UL (ref 4.4–11.3)

## 2025-04-06 PROCEDURE — 83605 ASSAY OF LACTIC ACID: CPT | Performed by: STUDENT IN AN ORGANIZED HEALTH CARE EDUCATION/TRAINING PROGRAM

## 2025-04-06 PROCEDURE — 96361 HYDRATE IV INFUSION ADD-ON: CPT

## 2025-04-06 PROCEDURE — 2500000004 HC RX 250 GENERAL PHARMACY W/ HCPCS (ALT 636 FOR OP/ED): Performed by: STUDENT IN AN ORGANIZED HEALTH CARE EDUCATION/TRAINING PROGRAM

## 2025-04-06 PROCEDURE — 85025 COMPLETE CBC W/AUTO DIFF WBC: CPT | Performed by: STUDENT IN AN ORGANIZED HEALTH CARE EDUCATION/TRAINING PROGRAM

## 2025-04-06 PROCEDURE — 36415 COLL VENOUS BLD VENIPUNCTURE: CPT | Performed by: STUDENT IN AN ORGANIZED HEALTH CARE EDUCATION/TRAINING PROGRAM

## 2025-04-06 PROCEDURE — 96375 TX/PRO/DX INJ NEW DRUG ADDON: CPT

## 2025-04-06 PROCEDURE — 99285 EMERGENCY DEPT VISIT HI MDM: CPT | Mod: 25 | Performed by: STUDENT IN AN ORGANIZED HEALTH CARE EDUCATION/TRAINING PROGRAM

## 2025-04-06 PROCEDURE — 74177 CT ABD & PELVIS W/CONTRAST: CPT | Performed by: RADIOLOGY

## 2025-04-06 PROCEDURE — 2550000001 HC RX 255 CONTRASTS: Performed by: STUDENT IN AN ORGANIZED HEALTH CARE EDUCATION/TRAINING PROGRAM

## 2025-04-06 PROCEDURE — 96374 THER/PROPH/DIAG INJ IV PUSH: CPT | Mod: 59

## 2025-04-06 PROCEDURE — 83690 ASSAY OF LIPASE: CPT | Performed by: STUDENT IN AN ORGANIZED HEALTH CARE EDUCATION/TRAINING PROGRAM

## 2025-04-06 PROCEDURE — 74177 CT ABD & PELVIS W/CONTRAST: CPT

## 2025-04-06 PROCEDURE — 81003 URINALYSIS AUTO W/O SCOPE: CPT | Performed by: STUDENT IN AN ORGANIZED HEALTH CARE EDUCATION/TRAINING PROGRAM

## 2025-04-06 PROCEDURE — 80053 COMPREHEN METABOLIC PANEL: CPT | Performed by: STUDENT IN AN ORGANIZED HEALTH CARE EDUCATION/TRAINING PROGRAM

## 2025-04-06 RX ORDER — ONDANSETRON HYDROCHLORIDE 2 MG/ML
4 INJECTION, SOLUTION INTRAVENOUS ONCE
Status: COMPLETED | OUTPATIENT
Start: 2025-04-06 | End: 2025-04-06

## 2025-04-06 RX ORDER — KETOROLAC TROMETHAMINE 15 MG/ML
15 INJECTION, SOLUTION INTRAMUSCULAR; INTRAVENOUS ONCE
Status: COMPLETED | OUTPATIENT
Start: 2025-04-06 | End: 2025-04-06

## 2025-04-06 RX ORDER — KETOROLAC TROMETHAMINE 10 MG/1
10 TABLET, FILM COATED ORAL EVERY 6 HOURS PRN
Qty: 12 TABLET | Refills: 0 | Status: SHIPPED | OUTPATIENT
Start: 2025-04-06 | End: 2025-04-14 | Stop reason: WASHOUT

## 2025-04-06 RX ORDER — METHOCARBAMOL 500 MG/1
500 TABLET, FILM COATED ORAL 3 TIMES DAILY
Qty: 21 TABLET | Refills: 0 | Status: SHIPPED | OUTPATIENT
Start: 2025-04-06 | End: 2025-04-14 | Stop reason: WASHOUT

## 2025-04-06 RX ADMIN — SODIUM CHLORIDE 1000 ML: 9 INJECTION, SOLUTION INTRAVENOUS at 05:40

## 2025-04-06 RX ADMIN — HYDROMORPHONE HYDROCHLORIDE 0.5 MG: 1 INJECTION, SOLUTION INTRAMUSCULAR; INTRAVENOUS; SUBCUTANEOUS at 06:55

## 2025-04-06 RX ADMIN — KETOROLAC TROMETHAMINE 15 MG: 15 INJECTION, SOLUTION INTRAMUSCULAR; INTRAVENOUS at 05:40

## 2025-04-06 RX ADMIN — ONDANSETRON 4 MG: 2 INJECTION, SOLUTION INTRAMUSCULAR; INTRAVENOUS at 06:55

## 2025-04-06 RX ADMIN — IOHEXOL 75 ML: 350 INJECTION, SOLUTION INTRAVENOUS at 06:29

## 2025-04-06 ASSESSMENT — LIFESTYLE VARIABLES
HAVE YOU EVER FELT YOU SHOULD CUT DOWN ON YOUR DRINKING: NO
EVER FELT BAD OR GUILTY ABOUT YOUR DRINKING: NO
EVER HAD A DRINK FIRST THING IN THE MORNING TO STEADY YOUR NERVES TO GET RID OF A HANGOVER: NO
TOTAL SCORE: 0
HAVE PEOPLE ANNOYED YOU BY CRITICIZING YOUR DRINKING: NO

## 2025-04-06 ASSESSMENT — PAIN SCALES - GENERAL
PAINLEVEL_OUTOF10: 0 - NO PAIN
PAINLEVEL_OUTOF10: 0 - NO PAIN
PAINLEVEL_OUTOF10: 5 - MODERATE PAIN
PAINLEVEL_OUTOF10: 4

## 2025-04-06 ASSESSMENT — PAIN - FUNCTIONAL ASSESSMENT
PAIN_FUNCTIONAL_ASSESSMENT: 0-10

## 2025-04-06 ASSESSMENT — COLUMBIA-SUICIDE SEVERITY RATING SCALE - C-SSRS
2. HAVE YOU ACTUALLY HAD ANY THOUGHTS OF KILLING YOURSELF?: NO
6. HAVE YOU EVER DONE ANYTHING, STARTED TO DO ANYTHING, OR PREPARED TO DO ANYTHING TO END YOUR LIFE?: NO
1. IN THE PAST MONTH, HAVE YOU WISHED YOU WERE DEAD OR WISHED YOU COULD GO TO SLEEP AND NOT WAKE UP?: NO

## 2025-04-06 NOTE — DISCHARGE INSTRUCTIONS
Return to the emergency department if you develop fever or vomiting.  Workup reassuring today.  Please follow-up with primary care doctor in 1 week for reevaluation.

## 2025-04-06 NOTE — ED PROVIDER NOTES
History/Exam limitations: none  HPI was provided by patient    HPI:    Chief Complaint   Patient presents with    Abdominal Pain    Flank Pain        Shaun Rodriguez is a 34 y.o. male with past medical history of kidney stones presenting chief complaint of right flank pain.  Pain radiates around from right flank to right groin.  Does state it feels similar to kidney stones in the past.  No nausea or vomiting but some frequency.  States he has tried taking anti-inflammatories prior to coming in with no improvement.  No nausea or vomiting.  Pain is a sharp pain that been constant since about Wednesday of this week.  They deny recent travel, suspicious food intake, similar symptoms found amongst close contacts or recent antibiotic use.       ROS:  All other review of systems are negative except as noted above and HPI or ROS.   CONSTITUTIONAL: fever, chills  ENT: sore throat, congestion, rhinorrhea  CARDIOVASCULAR: chest pain, palpitations, swelling  RESPIRATORY: cough, wheeze, shortness of breath  GI: nausea, vomiting, diarrhea, abdominal pain,  black or tarry stools, constipation  GENITOURINARY: dysuria, hematuria, frequency  MUSCULOSKELETAL: deformity, neck pain  SKIN: rash, lesion  NEUROLOGIC: headache, numbness, focal weakness  NOTES: All systems reviewed, negative except as described above     Physical Exam:  GENERAL: Alert, oriented , cooperative,  in no acute distress.  HEAD: normocephalic, atraumatic  SKIN:  dry skin, no lesions.  EYES: PERRL, EOMs intact,  Conjunctiva pink with no erythema or exudates. No scleral icterus.   ENT: No external deformities. Nares patent, mucus membranes moist.  Pharynx clear, uvula midline.   NECK: Supple, without meningismus. Trachea at midline. No lymphadenopathy.  PULMONARY: Clear bilaterally. No crackles, rhonchi, wheezing.  No respiratory distress.  No work of breathing.  CARDIAC: Regular rate and regular rhythm.  Pulses 2+ in radials and dorsal pedal pulses bilaterally.  No  murmur, rub, gallop.  No edema.  ABDOMEN: Soft, nontender, active bowel sounds.  No palpable organomegaly.  No rebound or guarding.  R CVA tenderness.  No pulsatile masses.  Negative Abbasi sign, negative McBurney point  MUSCULOSKELETAL: Full range of motion throughout, no deformity.   NEUROLOGICAL:  no focal neuro deficits.  Strength 5 out of 5 throughout bilateral upper and lower extremities neurovascular intact in bilateral upper and lower extremities  PSYCHIATRIC: Appropriate mood and affect. Calm.    Medical Decision Making:     The patient presented for evaluation of abdominal pain.  Differential included but not limited to UTI, bowel obstruction, appendicitis, constipation, viral illness, pancreatitis, cholecystitis, kidney stone.  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    Nondisplaced fracture of distal phalanx of right great toe, initial encounter for open fracture 06/11/2019    Open nondisplaced fracture of distal phalanx of right great toe, initial encounter    Pain in right toe(s) 06/11/2019    Pain of toe of right foot    Personal history of other benign neoplasm 07/31/2015    History of other benign neoplasm    Personal history of other diseases of the nervous system and sense organs 07/31/2015    History of exotropia    Streptococcal pharyngitis 09/13/2019    Streptococcus pharyngitis    Unspecified exophthalmos 02/02/2015    Proptosis    Unspecified exophthalmos 07/31/2015    Exophthalmos, acquired    Unspecified open wound of unspecified toe(s) without damage to nail, initial encounter 06/25/2019    Open  wound of toe, initial encounter      Social History     Socioeconomic History    Marital status:    Tobacco Use    Smoking status: Never    Smokeless tobacco: Never   Substance and Sexual Activity    Alcohol use: Never    Drug use: Never     Social Drivers of Health     Financial Resource Strain: Low Risk  (3/2/2024)    Overall Financial Resource Strain (CARDIA)     Difficulty of Paying Living Expenses: Not hard at all   Transportation Needs: No Transportation Needs (3/2/2024)    PRAPARE - Transportation     Lack of Transportation (Medical): No     Lack of Transportation (Non-Medical): No   Housing Stability: Low Risk  (3/2/2024)    Housing Stability Vital Sign     Unable to Pay for Housing in the Last Year: No     Number of Places Lived in the Last Year: 1     Unstable Housing in the Last Year: No     Current Outpatient Medications   Medication Instructions    acetaminophen (TYLENOL) 975 mg, oral, 4 times daily    ketorolac (TORADOL) 10 mg, oral, Every 6 hours PRN    methocarbamol (ROBAXIN) 500 mg, oral, 3 times daily, As needed for pain     No Known Allergies        ED Triage Vitals   Temp Pulse Resp BP   -- -- -- --      SpO2 Temp src Heart Rate Source Patient Position   -- -- -- --      BP Location FiO2 (%)     -- --                        ED Course as of 04/07/25 0433   Sun Apr 06, 2025   0730 Nonobstructive left-sided nephrolithiasis. No hydronephrosis or signs  of obstructive uropathy. Mild prominence of the walls of the urinary  bladder may relate to incomplete distention. Differential  considerations include cystitis, chronic bladder outlet obstruction,  or neurogenic bladder.   [HD]      ED Course User Index  [HD] Yumiko Costello DO         Diagnoses as of 04/07/25 0433   Flank pain               Procedure  Procedures         Note: This note was dictated by speech recognition. Minor errors in transcription may be present.          Ismael Masters DO  04/06/25 0547       Ismael Masters DO  04/07/25  0018

## 2025-04-06 NOTE — ED TRIAGE NOTES
Pt ambulatory to ER. Pt has been having flank pain and lower abdominal pain since Wednesday. Denies fever, SOB, V/D/N, denies urinary symptoms.

## 2025-04-06 NOTE — PROGRESS NOTES
Emergency Medicine Transition of Care Note.    I received Shaun Rodriguez in signout from Dr. Masters.  Please see the previous ED provider note for all HPI, PE and MDM up to the time of signout at 0600. This is in addition to the primary record.    In brief Shaun Rodriguez is an 34 y.o. male presenting for   Chief Complaint   Patient presents with    Abdominal Pain    Flank Pain     At the time of signout we were awaitin-year-old male presents to the emergency department flank pain.  Has a history of nephrolithiasis.  Urine unremarkable.  Signed out to me pending CT imaging.  CT relatively unremarkable without an etiology for his right back pain.  On my exam it is paraspinal in nature.  He has no signs or symptoms of cauda equina.  He has no urinary symptoms.  Urine clean despite differential diagnosis listed on CT.  He denies difficulty urinating.  Did discuss that he may have passed a stone and we missed it on imaging or this may be musculoskeletal in nature.  However treatment the same with multimodal pain regiment.  Discussed strict return precautions for fever, vomiting or lower extremity weakness.  Patient and wife at bedside expressed understanding and agreeable to plan.  Referred for outpatient primary care and encouraged to follow-up within 1 week.    ED Course as of 25 0742   Sun 2025   0730 Nonobstructive left-sided nephrolithiasis. No hydronephrosis or signs  of obstructive uropathy. Mild prominence of the walls of the urinary  bladder may relate to incomplete distention. Differential  considerations include cystitis, chronic bladder outlet obstruction,  or neurogenic bladder.   [HD]      ED Course User Index  [HD] Yumiko Costello DO         Diagnoses as of 25 0742   Flank pain       Procedure  Procedures    Yumiko Costello DO

## 2025-04-14 ENCOUNTER — APPOINTMENT (OUTPATIENT)
Dept: PRIMARY CARE | Facility: CLINIC | Age: 35
End: 2025-04-14
Payer: COMMERCIAL

## 2025-04-14 VITALS
DIASTOLIC BLOOD PRESSURE: 82 MMHG | BODY MASS INDEX: 29.46 KG/M2 | WEIGHT: 217.2 LBS | HEART RATE: 73 BPM | SYSTOLIC BLOOD PRESSURE: 142 MMHG | OXYGEN SATURATION: 93 %

## 2025-04-14 DIAGNOSIS — R22.0 NODULE OF SKIN OF HEAD: Primary | ICD-10-CM

## 2025-04-14 DIAGNOSIS — R03.0 ELEVATED BP WITHOUT DIAGNOSIS OF HYPERTENSION: ICD-10-CM

## 2025-04-14 PROCEDURE — 99214 OFFICE O/P EST MOD 30 MIN: CPT

## 2025-04-14 PROCEDURE — 1036F TOBACCO NON-USER: CPT

## 2025-04-14 ASSESSMENT — PATIENT HEALTH QUESTIONNAIRE - PHQ9
1. LITTLE INTEREST OR PLEASURE IN DOING THINGS: NOT AT ALL
2. FEELING DOWN, DEPRESSED OR HOPELESS: NOT AT ALL
SUM OF ALL RESPONSES TO PHQ9 QUESTIONS 1 AND 2: 0

## 2025-04-14 NOTE — PROGRESS NOTES
Subjective   Patient ID: Shaun Rodriguez is a 34 y.o. male who presents for Hospital Follow-up ([facility] ER 4/6  pulled muscle in back  ) and Cyst (Forehead ).  HPI  - pt presented to ED on 4/6 with pain radiating from R flank to R froin. Sharp and had been constant for 4-5 days. NSAIDs did not help. Some urinary frequency. No n/v. Has a hx of kidney stones and states it felt similar. UA was unremarkable. CT relatively unremarkable. Per ED, felt to be paraspinal. Did discuss w him he may have passed a stone missed on imaging vs msk in nature. Sent home w toradol, robaxin, tylenol.   - tells me today back is feeling better. No long requiring any medications.   - BP a little high today -- also was high in ED. 142/82 here. Says he doesnt eat too much salt. No cp, sob, ha, vision changes, dizziness.       - has had a nodule on his forehead for about a year  - has not grown much  - not very tender  - never changes colors  - doesnt get warm   - never drains anything    No current outpatient medications on file.   History reviewed. No pertinent surgical history.   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    Nondisplaced fracture of distal phalanx of right great toe, initial encounter for open fracture 06/11/2019    Open nondisplaced fracture of distal phalanx of right great toe, initial encounter    Pain in right toe(s) 06/11/2019    Pain of toe of right foot    Personal history of other benign neoplasm 07/31/2015    History of other benign neoplasm    Personal history of other diseases of the nervous system and sense organs 07/31/2015    History of exotropia    Streptococcal pharyngitis 09/13/2019    Streptococcus pharyngitis    Unspecified exophthalmos 02/02/2015    Proptosis    Unspecified exophthalmos 07/31/2015    Exophthalmos, acquired     Unspecified open wound of unspecified toe(s) without damage to nail, initial encounter 06/25/2019    Open wound of toe, initial encounter     Social History     Tobacco Use    Smoking status: Never    Smokeless tobacco: Never   Vaping Use    Vaping status: Never Used   Substance Use Topics    Alcohol use: Never    Drug use: Never      No family history on file.   Review of Systems  10 point ROS negative except as otherwise noted in the HPI.      Objective   /82   Pulse 73   Wt 98.5 kg (217 lb 3.2 oz)   SpO2 93%   BMI 29.46 kg/m²    Physical Exam  Constitutional:       Appearance: Normal appearance.   HENT:      Head: Normocephalic and atraumatic.   Eyes:      Extraocular Movements: Extraocular movements intact.      Pupils: Pupils are equal, round, and reactive to light.   Cardiovascular:      Rate and Rhythm: Normal rate and regular rhythm.      Pulses: Normal pulses.      Heart sounds: Normal heart sounds.   Pulmonary:      Effort: Pulmonary effort is normal.      Breath sounds: Normal breath sounds.   Musculoskeletal:         General: Normal range of motion.      Right lower leg: No edema.      Left lower leg: No edema.   Skin:     General: Skin is warm and dry.      Findings: No rash.      Comments: 1-2 cm round rubbery mobile nodule above R eyebrow. Nontender.   Neurological:      General: No focal deficit present.      Mental Status: He is alert and oriented to person, place, and time.   Psychiatric:         Mood and Affect: Mood normal.         Behavior: Behavior normal.           Assessment/Plan   Problem List Items Addressed This Visit    None  Visit Diagnoses       Nodule of skin of head    -  Primary  - 1-2 cm round rubbery mobile nodule on R side of forehead. Suspect sebaceous cyst. Refer to derm to determine definitive diagnosis and excision if needed.    Relevant Orders    Referral to Dermatology    Elevated BP without diagnosis of hypertension   - /82 today. Elevated  last few readings  as well. Recommended starting a medication but he wants to try to watch his diet for 2 weeks or so and come back in for BP check.  Given info on HTN, heart healthy diet, checking BP at home            Discussed at visit any disease processes that were of concern as well as the risks, benefits and instructions on any new medication provided. Patient (and/or caretaker of patient if present) stated all questions were answered, and they voiced understanding of instructions.     Cyndie Cuevas PA-C

## 2025-04-24 DIAGNOSIS — R52 PAIN: Primary | ICD-10-CM

## 2025-04-24 RX ORDER — KETOROLAC TROMETHAMINE 10 MG/1
10 TABLET, FILM COATED ORAL 4 TIMES DAILY PRN
Qty: 20 TABLET | Refills: 0 | Status: SHIPPED | OUTPATIENT
Start: 2025-04-24 | End: 2025-04-29

## 2025-04-24 RX ORDER — METHOCARBAMOL 500 MG/1
500 TABLET, FILM COATED ORAL 4 TIMES DAILY PRN
Qty: 40 TABLET | Refills: 0 | Status: SHIPPED | OUTPATIENT
Start: 2025-04-24 | End: 2025-06-23

## 2025-04-29 ENCOUNTER — APPOINTMENT (OUTPATIENT)
Dept: PRIMARY CARE | Facility: CLINIC | Age: 35
End: 2025-04-29
Payer: COMMERCIAL

## (undated) DEVICE — NEEDLE, BLUNT FILL, 18GA X 1 IN

## (undated) DEVICE — SHEATH, RETRACE URETERAL ACCESS, 10-12 FR 45CM

## (undated) DEVICE — GUIDEWIRE, STRAIGHT, AMPLATZ SUPER STIFF, 0.035 IN X 180 CM

## (undated) DEVICE — SOLUTION, INJECTION, SODIUM CHLORIDE 9%, 3000ML

## (undated) DEVICE — CATHETER, URETERAL, POLLACK, OPEN END, 5.5 FR, 70 CM

## (undated) DEVICE — Device

## (undated) DEVICE — GOWN, STANDARD, ULTRA, XX-LARGE

## (undated) DEVICE — TUBING, SUCTION, CONNECTING, NON-CONDUCTIVE, SURE GRIP CONNECTORS, 3/16 IN X 10 FT

## (undated) DEVICE — GLOVE, SURGICAL, PROTEXIS,  8.0, PF, LATEX

## (undated) DEVICE — SYRINGE, 20 CC, LUER LOCK, MONOJECT, W/O CAP, LF

## (undated) DEVICE — BASKET, URETERAL, STONE RETRIEVAL, N-CIRCLE, NITNOL

## (undated) DEVICE — IRRIGATION KIT, PUMPING, SINGLE ACTION, SYRINGE, 10 CC

## (undated) DEVICE — GUIDEWIRE, ULTRA TRACK, HYBRID, 0.035 IN X 150CM